# Patient Record
Sex: FEMALE | Race: WHITE | NOT HISPANIC OR LATINO | URBAN - METROPOLITAN AREA
[De-identification: names, ages, dates, MRNs, and addresses within clinical notes are randomized per-mention and may not be internally consistent; named-entity substitution may affect disease eponyms.]

---

## 2018-09-28 LAB
HBV SURFACE AG SERPL QL IA: NEGATIVE
HIV 1+2 AB+HIV1 P24 AG SERPL QL IA: NEGATIVE
RUBELLA ANTIBODY IGG QUANTITATIVE: NORMAL IU/ML

## 2019-04-19 LAB — GROUP B STREP PCR: NEGATIVE

## 2019-05-16 ENCOUNTER — HOSPITAL ENCOUNTER (INPATIENT)
Facility: CLINIC | Age: 34
LOS: 5 days | Discharge: HOME HEALTH | End: 2019-05-21
Attending: OBSTETRICS & GYNECOLOGY | Admitting: OBSTETRICS & GYNECOLOGY
Payer: COMMERCIAL

## 2019-05-16 LAB
GLUCOSE BLDC GLUCOMTR-MCNC: 69 MG/DL (ref 70–99)
HGB BLD-MCNC: 11.1 G/DL (ref 11.7–15.7)

## 2019-05-16 PROCEDURE — 86901 BLOOD TYPING SEROLOGIC RH(D): CPT | Performed by: OBSTETRICS & GYNECOLOGY

## 2019-05-16 PROCEDURE — 85018 HEMOGLOBIN: CPT | Performed by: OBSTETRICS & GYNECOLOGY

## 2019-05-16 PROCEDURE — 86850 RBC ANTIBODY SCREEN: CPT | Performed by: OBSTETRICS & GYNECOLOGY

## 2019-05-16 PROCEDURE — 12000000 ZZH R&B MED SURG/OB

## 2019-05-16 PROCEDURE — 86900 BLOOD TYPING SEROLOGIC ABO: CPT | Performed by: OBSTETRICS & GYNECOLOGY

## 2019-05-16 PROCEDURE — 25000132 ZZH RX MED GY IP 250 OP 250 PS 637: Performed by: OBSTETRICS & GYNECOLOGY

## 2019-05-16 PROCEDURE — 00000146 ZZHCL STATISTIC GLUCOSE BY METER IP

## 2019-05-16 PROCEDURE — 86780 TREPONEMA PALLIDUM: CPT | Performed by: OBSTETRICS & GYNECOLOGY

## 2019-05-16 RX ORDER — ASPIRIN 81 MG/1
81 TABLET ORAL DAILY
Status: ON HOLD | COMMUNITY
End: 2019-05-19

## 2019-05-16 RX ORDER — DEXTROSE MONOHYDRATE 25 G/50ML
25-50 INJECTION, SOLUTION INTRAVENOUS
Status: DISCONTINUED | OUTPATIENT
Start: 2019-05-16 | End: 2019-05-18

## 2019-05-16 RX ORDER — OXYTOCIN 10 [USP'U]/ML
10 INJECTION, SOLUTION INTRAMUSCULAR; INTRAVENOUS
Status: DISCONTINUED | OUTPATIENT
Start: 2019-05-16 | End: 2019-05-18

## 2019-05-16 RX ORDER — OXYCODONE AND ACETAMINOPHEN 5; 325 MG/1; MG/1
1 TABLET ORAL
Status: DISCONTINUED | OUTPATIENT
Start: 2019-05-16 | End: 2019-05-18

## 2019-05-16 RX ORDER — METHYLERGONOVINE MALEATE 0.2 MG/ML
200 INJECTION INTRAVENOUS
Status: DISCONTINUED | OUTPATIENT
Start: 2019-05-16 | End: 2019-05-18

## 2019-05-16 RX ORDER — SODIUM CHLORIDE, SODIUM LACTATE, POTASSIUM CHLORIDE, CALCIUM CHLORIDE 600; 310; 30; 20 MG/100ML; MG/100ML; MG/100ML; MG/100ML
INJECTION, SOLUTION INTRAVENOUS CONTINUOUS
Status: DISCONTINUED | OUTPATIENT
Start: 2019-05-16 | End: 2019-05-18

## 2019-05-16 RX ORDER — ZOLPIDEM TARTRATE 5 MG/1
5 TABLET ORAL
Status: DISCONTINUED | OUTPATIENT
Start: 2019-05-16 | End: 2019-05-18

## 2019-05-16 RX ORDER — ONDANSETRON 2 MG/ML
4 INJECTION INTRAMUSCULAR; INTRAVENOUS EVERY 6 HOURS PRN
Status: DISCONTINUED | OUTPATIENT
Start: 2019-05-16 | End: 2019-05-18

## 2019-05-16 RX ORDER — FENTANYL CITRATE 50 UG/ML
50-100 INJECTION, SOLUTION INTRAMUSCULAR; INTRAVENOUS
Status: DISCONTINUED | OUTPATIENT
Start: 2019-05-16 | End: 2019-05-18

## 2019-05-16 RX ORDER — NICOTINE POLACRILEX 4 MG
15-30 LOZENGE BUCCAL
Status: DISCONTINUED | OUTPATIENT
Start: 2019-05-16 | End: 2019-05-18

## 2019-05-16 RX ORDER — OXYTOCIN/0.9 % SODIUM CHLORIDE 30/500 ML
100-340 PLASTIC BAG, INJECTION (ML) INTRAVENOUS CONTINUOUS PRN
Status: DISCONTINUED | OUTPATIENT
Start: 2019-05-16 | End: 2019-05-18

## 2019-05-16 RX ORDER — CALCIUM CARBONATE 500 MG/1
1000 TABLET, CHEWABLE ORAL EVERY 6 HOURS PRN
Status: DISCONTINUED | OUTPATIENT
Start: 2019-05-16 | End: 2019-05-18

## 2019-05-16 RX ORDER — IBUPROFEN 800 MG/1
800 TABLET, FILM COATED ORAL
Status: DISCONTINUED | OUTPATIENT
Start: 2019-05-16 | End: 2019-05-18

## 2019-05-16 RX ORDER — ACETAMINOPHEN 325 MG/1
650 TABLET ORAL EVERY 4 HOURS PRN
Status: DISCONTINUED | OUTPATIENT
Start: 2019-05-16 | End: 2019-05-18

## 2019-05-16 RX ORDER — DIPHENHYDRAMINE HCL 25 MG
25-50 CAPSULE ORAL EVERY 6 HOURS PRN
Status: DISCONTINUED | OUTPATIENT
Start: 2019-05-16 | End: 2019-05-18

## 2019-05-16 RX ORDER — VITAMIN A ACETATE, .BETA.-CAROTENE, ASCORBIC ACID, CHOLECALCIFEROL, .ALPHA.-TOCOPHEROL ACETATE, DL-, THIAMINE MONONITRATE, RIBOFLAVIN, NIACINAMIDE, PYRIDOXINE HYDROCHLORIDE, FOLIC ACID, CYANOCOBALAMIN, CALCIUM CARBONATE, FERROUS FUMARATE, ZINC OXIDE, AND CUPRIC OXIDE 2000; 2000; 120; 400; 22; 1.84; 3; 20; 10; 1; 12; 200; 27; 25; 2 [IU]/1; [IU]/1; MG/1; [IU]/1; MG/1; MG/1; MG/1; MG/1; MG/1; MG/1; UG/1; MG/1; MG/1; MG/1; MG/1
1 TABLET ORAL DAILY
COMMUNITY

## 2019-05-16 RX ORDER — CARBOPROST TROMETHAMINE 250 UG/ML
250 INJECTION, SOLUTION INTRAMUSCULAR
Status: DISCONTINUED | OUTPATIENT
Start: 2019-05-16 | End: 2019-05-18

## 2019-05-16 RX ORDER — NALOXONE HYDROCHLORIDE 0.4 MG/ML
.1-.4 INJECTION, SOLUTION INTRAMUSCULAR; INTRAVENOUS; SUBCUTANEOUS
Status: DISCONTINUED | OUTPATIENT
Start: 2019-05-16 | End: 2019-05-17

## 2019-05-16 RX ADMIN — DINOPROSTONE 10 MG: 10 INSERT VAGINAL at 22:54

## 2019-05-17 ENCOUNTER — ANESTHESIA (OUTPATIENT)
Dept: OBGYN | Facility: CLINIC | Age: 34
End: 2019-05-17
Payer: COMMERCIAL

## 2019-05-17 ENCOUNTER — ANESTHESIA EVENT (OUTPATIENT)
Dept: OBGYN | Facility: CLINIC | Age: 34
End: 2019-05-17
Payer: COMMERCIAL

## 2019-05-17 LAB
GLUCOSE BLDC GLUCOMTR-MCNC: 106 MG/DL (ref 70–99)
GLUCOSE BLDC GLUCOMTR-MCNC: 118 MG/DL (ref 70–99)
GLUCOSE BLDC GLUCOMTR-MCNC: 129 MG/DL (ref 70–99)
GLUCOSE BLDC GLUCOMTR-MCNC: 172 MG/DL (ref 70–99)
GLUCOSE BLDC GLUCOMTR-MCNC: 54 MG/DL (ref 70–99)
GLUCOSE BLDC GLUCOMTR-MCNC: 76 MG/DL (ref 70–99)
GLUCOSE BLDC GLUCOMTR-MCNC: 83 MG/DL (ref 70–99)
T PALLIDUM AB SER QL: NONREACTIVE

## 2019-05-17 PROCEDURE — 00000146 ZZHCL STATISTIC GLUCOSE BY METER IP

## 2019-05-17 PROCEDURE — 40000671 ZZH STATISTIC ANESTHESIA CASE

## 2019-05-17 PROCEDURE — 25000132 ZZH RX MED GY IP 250 OP 250 PS 637: Performed by: OBSTETRICS & GYNECOLOGY

## 2019-05-17 PROCEDURE — 25800030 ZZH RX IP 258 OP 636: Performed by: OBSTETRICS & GYNECOLOGY

## 2019-05-17 PROCEDURE — 37000011 ZZH ANESTHESIA WARD SERVICE

## 2019-05-17 PROCEDURE — 3E0P7VZ INTRODUCTION OF HORMONE INTO FEMALE REPRODUCTIVE, VIA NATURAL OR ARTIFICIAL OPENING: ICD-10-PCS | Performed by: OBSTETRICS & GYNECOLOGY

## 2019-05-17 PROCEDURE — 12000000 ZZH R&B MED SURG/OB

## 2019-05-17 PROCEDURE — 25000128 H RX IP 250 OP 636: Performed by: OBSTETRICS & GYNECOLOGY

## 2019-05-17 RX ORDER — NALOXONE HYDROCHLORIDE 0.4 MG/ML
.1-.4 INJECTION, SOLUTION INTRAMUSCULAR; INTRAVENOUS; SUBCUTANEOUS
Status: DISCONTINUED | OUTPATIENT
Start: 2019-05-17 | End: 2019-05-17

## 2019-05-17 RX ORDER — NALBUPHINE HYDROCHLORIDE 10 MG/ML
2.5-5 INJECTION, SOLUTION INTRAMUSCULAR; INTRAVENOUS; SUBCUTANEOUS EVERY 6 HOURS PRN
Status: DISCONTINUED | OUTPATIENT
Start: 2019-05-17 | End: 2019-05-18

## 2019-05-17 RX ORDER — TERBUTALINE SULFATE 1 MG/ML
0.25 INJECTION, SOLUTION SUBCUTANEOUS
Status: DISCONTINUED | OUTPATIENT
Start: 2019-05-17 | End: 2019-05-18

## 2019-05-17 RX ORDER — BUPIVACAINE HCL/0.9 % NACL/PF 0.125 %
PLASTIC BAG, INJECTION (ML) EPIDURAL CONTINUOUS
Status: DISCONTINUED | OUTPATIENT
Start: 2019-05-17 | End: 2019-05-18

## 2019-05-17 RX ORDER — NALOXONE HYDROCHLORIDE 0.4 MG/ML
.1-.4 INJECTION, SOLUTION INTRAMUSCULAR; INTRAVENOUS; SUBCUTANEOUS
Status: DISCONTINUED | OUTPATIENT
Start: 2019-05-17 | End: 2019-05-18

## 2019-05-17 RX ORDER — MISOPROSTOL 100 UG/1
25 TABLET ORAL
Status: DISCONTINUED | OUTPATIENT
Start: 2019-05-17 | End: 2019-05-18

## 2019-05-17 RX ORDER — EPHEDRINE SULFATE 50 MG/ML
5 INJECTION, SOLUTION INTRAMUSCULAR; INTRAVENOUS; SUBCUTANEOUS
Status: DISCONTINUED | OUTPATIENT
Start: 2019-05-17 | End: 2019-05-18

## 2019-05-17 RX ORDER — EPHEDRINE SULFATE 50 MG/ML
5 INJECTION, SOLUTION INTRAMUSCULAR; INTRAVENOUS; SUBCUTANEOUS
Status: DISCONTINUED | OUTPATIENT
Start: 2019-05-17 | End: 2019-05-17

## 2019-05-17 RX ORDER — BUPIVACAINE HCL/0.9 % NACL/PF 0.125 %
PLASTIC BAG, INJECTION (ML) EPIDURAL CONTINUOUS
Status: DISCONTINUED | OUTPATIENT
Start: 2019-05-17 | End: 2019-05-17

## 2019-05-17 RX ORDER — OXYTOCIN/0.9 % SODIUM CHLORIDE 30/500 ML
1-24 PLASTIC BAG, INJECTION (ML) INTRAVENOUS CONTINUOUS
Status: DISCONTINUED | OUTPATIENT
Start: 2019-05-17 | End: 2019-05-18

## 2019-05-17 RX ORDER — LIDOCAINE 40 MG/G
CREAM TOPICAL
Status: DISCONTINUED | OUTPATIENT
Start: 2019-05-17 | End: 2019-05-18

## 2019-05-17 RX ADMIN — SODIUM CHLORIDE, POTASSIUM CHLORIDE, SODIUM LACTATE AND CALCIUM CHLORIDE 500 ML: 600; 310; 30; 20 INJECTION, SOLUTION INTRAVENOUS at 14:23

## 2019-05-17 RX ADMIN — Medication 25 MCG: at 14:51

## 2019-05-17 RX ADMIN — Medication 25 MCG: at 12:24

## 2019-05-17 RX ADMIN — FENTANYL CITRATE 50 MCG: 50 INJECTION, SOLUTION INTRAMUSCULAR; INTRAVENOUS at 20:51

## 2019-05-17 RX ADMIN — SODIUM CHLORIDE, POTASSIUM CHLORIDE, SODIUM LACTATE AND CALCIUM CHLORIDE: 600; 310; 30; 20 INJECTION, SOLUTION INTRAVENOUS at 21:01

## 2019-05-17 RX ADMIN — Medication 25 MCG: at 16:56

## 2019-05-17 RX ADMIN — FENTANYL CITRATE 100 MCG: 50 INJECTION, SOLUTION INTRAMUSCULAR; INTRAVENOUS at 22:20

## 2019-05-17 RX ADMIN — FENTANYL CITRATE 50 MCG: 50 INJECTION, SOLUTION INTRAMUSCULAR; INTRAVENOUS at 23:57

## 2019-05-17 NOTE — PROVIDER NOTIFICATION
05/17/19 1230   Provider Notification   Provider Name/Title Dr. Tan   Method of Notification In Department   Request Evaluate - Remote   Notification Reason Status Update   MD updated on BS with patient feeling dizzy previous to this RN entering room, so patient took 4 glucose tablets per patient.  Orders received to have patient eat lunch. Will recheck BS in 1 hour.

## 2019-05-17 NOTE — H&P
[facility] Labor and Delivery History and Physical    Audra Stewart MRN# [id number]   Age: 34 year old YOB: 1985     Date of Admission:  2019    Primary care provider: Jimmy Hui         HPI:   Audra Stewart is a 34 year old  at 39w6d by LMP c/w 6w6d US admitted for IOL secondary to T1DM on insulin pump.    She denies any vaginal bleeding, leakage of fluid or changes in her vaginal discharge.  She denies any regular, painful contractions.  Good fetal movement.     Pregnancy c/b:   - T1DM; A1c last of 6.6% on 19           Pregnancy history:     OBSTETRIC HISTORY:  OB History    Para Term  AB Living   1 0 0 0 0 0   SAB TAB Ectopic Multiple Live Births   0 0 0 0 0      # Outcome Date GA Lbr Alex/2nd Weight Sex Delivery Anes PTL Lv   1 Current                EDC: Estimated Date of Delivery: May 18, 2019    Prenatal Labs:   Lab Results   Component Value Date    ABO O 2019    RH Pos 2019    HGB 11.1 (L) 2019       GBS Status:   GBS negative on     Ultrasounds:  Growth US on 19:   INDICATIONS: Type 1 DM in excellent control.  Probable bicuspid aortic   valve.     --Biometric measures indicate appropriate interval fetal growth.  The EFW   is 2898 gm, which is at the 49th percentile for gestational age.  The AC   is at the 56th percentile for gestational age.   --Amniotic fluid volume is normal, with an KAUSHIK of 13.6 cm.  --No anatomic abnormalities are noted, within the limits of visualization   imposed by gestational age, fetal position, and maternal factors.   --No evidence of placenta previa.  --BPP is reassuring with a score of 8 of 8.   --NST is REACTIVE with baseline FHR of 130 bpm, moderate variability, >2   accelerations of at least 15 bpm lasting at least 15 seconds and no   decelerations. Therefore, total BPP is 10 of 10.   --Cephalic presentation.         Maternal Past Medical History:     Past Medical History:   Diagnosis  Date     Type 1 DM      Past Surgical History:   Procedure Laterality Date     None        Medications Prior to Admission   Medication Sig Dispense Refill Last Dose     aspirin 81 MG EC tablet Take 81 mg by mouth daily   2019 at Unknown time     insulin lispro (HUMALOG) 100 UNIT/ML injection by Device route See Admin Instructions   2019 at Unknown time     Prenatal Vit-Fe Fumarate-FA (PNV PRENATAL PLUS MULTIVITAMIN) 27-1 MG TABS per tablet Take 1 tablet by mouth daily   2019 at Unknown time           Family History:   family history is not on file.          Social History:     Social History     Tobacco Use     Smoking status: Never Smoker     Smokeless tobacco: Never Used   Substance Use Topics     Alcohol use: Not Currently            Review of Systems:   The Review of Systems is negative other than noted in the HPI          Physical Exam:     Patient Vitals for the past 8 hrs:   BP Temp Temp src Resp   19 0930 132/78 98  F (36.7  C) Oral 18   19 0714 120/79 98  F (36.7  C) Oral 18   19 0613 126/80 98.6  F (37  C) Oral 16     Gen: Pleasant, NAD   CV:  Regular rate and rhythm, no murmurs, rubs or gallops appreciated   Resp: Non-labored breathing.  Lungs clear to ausculation bilaterally   Abd: Obese, soft, non-tender and non-distended   Ext: 2+ pedal edema bilaterally     Cervix: 0/30%/-3 on admission, FT/70/-4, posterior and medium   Membranes: Intact  EFW: 7-7.5 lbs.  Presentation:Cephalic    Fetal Heart Rate Tracing:   Baseline 135  Variability: Moderate  Accelerations: Present  Decelerations: None  Interpretation: reactive    Contractions: q 2-6 min per EFM        Assessment:   Audra Stewart is a 34 year old  at 39w6d admitted for IOL secondary to T1DM.        Plan:     Labor:   - Boyer score of 0 on admission, Cervadil placed at 2254 for ripening and just removed.  Patient still with unfavorable SVE.  Discussed recommendations for proceed with PO misoprostol.  She is  amenable to this.   Also discussed possible need/option for cook balloon which she was also amenable to.   Pain: Per patient preference, open to epidural     FWB:   - Continous EFM   - Category I FHT, reactive   - EFW is 2898 gm, which is at the 49th percentile for gestational age. The AC is at the 56th percentile for gestational age.     T1DM:   - Can use personal insulin pump until active labor  - Glucoses per unit protocol  - Insulin gtt in active labor with BG > or = 110   - Internal medicine consult PP per protocol     Other:   - Rh positive, RI, placenta posterior, EFW 7.5#   - GBS negative     Nancy Tan MD   Pager: [phone]   May 17, 2019  10:59 AM

## 2019-05-17 NOTE — PROVIDER NOTIFICATION
05/17/19 0118   Provider Notification   Provider Name/Title Dr. Fajardo   Method of Notification Phone   Request Evaluate - Remote   Notification Reason Status Update;Uterine Activity     Dr. Fajardo updated regarding insulin pump order set. Hospitalist must enter orders and review pump setting and use. MD states that Pt may continue to self-manage pump. Dr. Schuster will reassess Pt use and necessity of pump and orders in AM. May request hospitalist consult at that time if needed.  Pt is nadja occasionally, not feeling contractions. FHR primarily category 1. Late decel x1, moderate variabiliy and accels present.

## 2019-05-17 NOTE — PROVIDER NOTIFICATION
05/17/19 1400   Provider Notification   Provider Name/Title Dr. Tan   Method of Notification In Department   Request Evaluate - Remote   Notification Reason Status Update   MD updated on FHT's with intermittent LD just starting, patient repositioned.  Orders received to continue to monitor and to hold oral Cytotec if LD do not resolve.

## 2019-05-17 NOTE — PLAN OF CARE
Patient blood sugar found to be 172, per MD patient utilizing her insulin pump.  Patient programmed for the pump to give her appropriate insulin for treating the blood sugar. Will plan to reassess patient blood sugar.

## 2019-05-17 NOTE — PROVIDER NOTIFICATION
05/17/19 1757   Provider Notification   Provider Name/Title Dr. Tan   Method of Notification Phone   Request Evaluate - Remote   Notification Reason Status Update   MD updated on patient SROM at 1730 with clear fluid, patient getting more uncomfortable, FHT's with PD and FHT's. WNL after labor interventions of O2, fluid bolus, and position changes. MD also updated on blood pressures. Orders received to continue to monitor.  MD enter orders for pitocin augmentation prn. Dr. Mortensen assuming cares at 1900.

## 2019-05-17 NOTE — PROVIDER NOTIFICATION
05/16/19 2050   Provider Notification   Provider Name/Title Dr. Fajardo   Method of Notification Phone   Request Evaluate - Remote   Notification Reason Patient Arrived;SVE;Uterine Activity     Dr. Fajardo updated on Pt arrival. Pt here for induction due to Type 1 DM. Pt states BG has been very well controlled during pregnancy with her insulin pump. MD states Pt may use insulin pump until in active labor, then nursing is to follow active labor diabetes management order set and discontinue insulin pump. Antepartum diabetes management orders in place. Pt may have snacks overnight and eat a light breakfast in the morning. SVE 0/30/-3. Boyer score of 0. Pt is nadja occasionally, not feeling contractions. FHR category 1. Orders received for induction with Cervidil. Intrapartum orders received. Orders received for Ambien 5mg PO and Benadryl PO 25-50 mg for sleep if requesting. Pt may also have Tums q6 hrs PRN. Will discuss with Pt her current schedule of BG monitoring and target BG. Will call MD to receive further orders for diabetes management.

## 2019-05-17 NOTE — PROVIDER NOTIFICATION
05/17/19 0800   Provider Notification   Provider Name/Title Dr. Tan   Method of Notification Phone   Request Evaluate - Remote   Notification Reason Status Update   MD updated on patient BG this morning, patient utilizing the insulin pump that she uses at home, FHT's, contraction pattern, patient requesting to walk and shower, and patient feeling some cramping.  Orders received to have patient use insulin pump until active labor, then we will plan to switch to our IV insulin protocol, MD will plan to come remove cervidil at 1100 and perform SVE, and then patient may shower and ambulate following removal.

## 2019-05-17 NOTE — PLAN OF CARE
Data: [facility]: 2019  7:38 PM.  Patient admitted for induction for Type 1 DM. Patient is a .  Prenatal record reviewed. Pregnancy  has been complicated by type 1 DM.  Gestational Age 39w5d. VSS. Fetal movement present. Patient denies uterine contractions, leaking of vaginal fluid/rupture of membranes, vaginal bleeding, abdominal pain, pelvic pressure, nausea, vomiting, headache, visual disturbances, epigastric or URQ pain, significant edema. Support person is present.   Action: Verbal consent for EFM.  Admission assessment completed. Bill of rights reviewed.  Response: Patient verbalized agreement with plan. Will contact Dr Beatriz Fajardo with update and further orders.

## 2019-05-17 NOTE — PROVIDER NOTIFICATION
05/16/19 9520   Provider Notification   Provider Name/Title Dr. Fajardo   Method of Notification Phone   Request Evaluate - Remote   Notification Reason Other (Comment)  (Diabetes management orders)     Dr. Fajardo updated. Pt states she checks BG before breakfast, before eating dinner, and at HS. MD states to check BG before all meals and at HS. Pt has followed closely with endocrinology and states she was told her target BG during labor is be  mg/dL. MD states to follow this target. Will follow active labor diabetes management order set and discontinue insulin pump once in active labor.

## 2019-05-17 NOTE — PLAN OF CARE
External monitors removed per MD, patient up to shower and ambulate. Will plan to start oral Cytotec per MD after patient has showered and ambulated.

## 2019-05-17 NOTE — PROVIDER NOTIFICATION
05/17/19 1831   Provider Notification   Provider Name/Title Dr. Mortensen   Method of Notification Phone   Request Evaluate - Remote   Notification Reason Status Update   MD updated on FHT's, contraction pattern, blood sugar, and patient treating blood sugar with insulin pump. Orders received to recheck BS in 1 hours.

## 2019-05-17 NOTE — PROVIDER NOTIFICATION
05/17/19 1431   Provider Notification   Provider Name/Title Dr. Tan   Method of Notification In Department   Request Evaluate - Remote   Notification Reason Status Update   MD updated on patient repositioned again and IV fluid bolus infusing, and oral Cytotec held at this point. MD remaining in department.

## 2019-05-17 NOTE — PROVIDER NOTIFICATION
05/17/19 1040   Provider Notification   Provider Name/Title Dr. Tan   Method of Notification At Bedside   Request Evaluate in Person   Notification Reason Status Update   MD at bedside to remove cervidil, perform SVE, and discuss POC with patient. MD reviewed FHT's and contraction pattern. Orders received to allow patient to shower and ambulate and then start oral Cytotec.

## 2019-05-17 NOTE — PLAN OF CARE
Cervidil inserted at 2254. Pt able to rest well overnight. BG checked at 0351, Pt stated her sensor alarmed due to a low BG. BG 76 mg/dL, Pt asymptomatic. Pt is now feeling cramping but declines pain medications at this time. FHR primarily category 1 overnight.

## 2019-05-18 ENCOUNTER — ANESTHESIA (OUTPATIENT)
Dept: OBGYN | Facility: CLINIC | Age: 34
End: 2019-05-18
Payer: COMMERCIAL

## 2019-05-18 ENCOUNTER — ANESTHESIA EVENT (OUTPATIENT)
Dept: OBGYN | Facility: CLINIC | Age: 34
End: 2019-05-18
Payer: COMMERCIAL

## 2019-05-18 PROBLEM — O24.919 DIABETES MELLITUS AFFECTING PREGNANCY: Status: ACTIVE | Noted: 2019-05-18

## 2019-05-18 PROBLEM — R31.0 GROSS HEMATURIA: Status: ACTIVE | Noted: 2019-05-18

## 2019-05-18 PROBLEM — O61.9 FAILED INDUCTION OF LABOR: Status: ACTIVE | Noted: 2019-05-18

## 2019-05-18 LAB
ABO + RH BLD: NORMAL
BLD GP AB SCN SERPL QL: NORMAL
BLOOD BANK CMNT PATIENT-IMP: NORMAL
GLUCOSE BLDC GLUCOMTR-MCNC: 100 MG/DL (ref 70–99)
GLUCOSE BLDC GLUCOMTR-MCNC: 115 MG/DL (ref 70–99)
GLUCOSE BLDC GLUCOMTR-MCNC: 127 MG/DL (ref 70–99)
GLUCOSE BLDC GLUCOMTR-MCNC: 134 MG/DL (ref 70–99)
GLUCOSE BLDC GLUCOMTR-MCNC: 138 MG/DL (ref 70–99)
GLUCOSE BLDC GLUCOMTR-MCNC: 141 MG/DL (ref 70–99)
GLUCOSE BLDC GLUCOMTR-MCNC: 142 MG/DL (ref 70–99)
GLUCOSE BLDC GLUCOMTR-MCNC: 145 MG/DL (ref 70–99)
GLUCOSE BLDC GLUCOMTR-MCNC: 146 MG/DL (ref 70–99)
GLUCOSE BLDC GLUCOMTR-MCNC: 156 MG/DL (ref 70–99)
GLUCOSE BLDC GLUCOMTR-MCNC: 172 MG/DL (ref 70–99)
GLUCOSE BLDC GLUCOMTR-MCNC: 175 MG/DL (ref 70–99)
GLUCOSE BLDC GLUCOMTR-MCNC: 222 MG/DL (ref 70–99)
GLUCOSE BLDC GLUCOMTR-MCNC: 49 MG/DL (ref 70–99)
GLUCOSE BLDC GLUCOMTR-MCNC: 78 MG/DL (ref 70–99)
GLUCOSE BLDC GLUCOMTR-MCNC: 95 MG/DL (ref 70–99)
HBA1C MFR BLD: 6.3 % (ref 0–5.6)
SPECIMEN EXP DATE BLD: NORMAL
SPECIMEN EXP DATE BLD: NORMAL

## 2019-05-18 PROCEDURE — 25000125 ZZHC RX 250: Performed by: NURSE ANESTHETIST, CERTIFIED REGISTERED

## 2019-05-18 PROCEDURE — 25000128 H RX IP 250 OP 636: Performed by: NURSE ANESTHETIST, CERTIFIED REGISTERED

## 2019-05-18 PROCEDURE — 25800030 ZZH RX IP 258 OP 636: Performed by: OBSTETRICS & GYNECOLOGY

## 2019-05-18 PROCEDURE — 25000128 H RX IP 250 OP 636: Performed by: ANESTHESIOLOGY

## 2019-05-18 PROCEDURE — 27110038 ZZH RX 271: Performed by: ANESTHESIOLOGY

## 2019-05-18 PROCEDURE — 37000009 ZZH ANESTHESIA TECHNICAL FEE, EACH ADDTL 15 MIN: Performed by: OBSTETRICS & GYNECOLOGY

## 2019-05-18 PROCEDURE — 12000000 ZZH R&B MED SURG/OB

## 2019-05-18 PROCEDURE — 99232 SBSQ HOSP IP/OBS MODERATE 35: CPT | Performed by: INTERNAL MEDICINE

## 2019-05-18 PROCEDURE — 36000058 ZZH SURGERY LEVEL 3 EA 15 ADDTL MIN: Performed by: OBSTETRICS & GYNECOLOGY

## 2019-05-18 PROCEDURE — 83036 HEMOGLOBIN GLYCOSYLATED A1C: CPT | Performed by: INTERNAL MEDICINE

## 2019-05-18 PROCEDURE — 25000128 H RX IP 250 OP 636: Performed by: OBSTETRICS & GYNECOLOGY

## 2019-05-18 PROCEDURE — 36000056 ZZH SURGERY LEVEL 3 1ST 30 MIN: Performed by: OBSTETRICS & GYNECOLOGY

## 2019-05-18 PROCEDURE — 99207 ZZC CONSULT E&M CHANGED TO SUBSEQUENT LEVEL: CPT | Performed by: INTERNAL MEDICINE

## 2019-05-18 PROCEDURE — 37000008 ZZH ANESTHESIA TECHNICAL FEE, 1ST 30 MIN: Performed by: OBSTETRICS & GYNECOLOGY

## 2019-05-18 PROCEDURE — 25000125 ZZHC RX 250: Performed by: ANESTHESIOLOGY

## 2019-05-18 PROCEDURE — 3E0R3BZ INTRODUCTION OF ANESTHETIC AGENT INTO SPINAL CANAL, PERCUTANEOUS APPROACH: ICD-10-PCS | Performed by: ANESTHESIOLOGY

## 2019-05-18 PROCEDURE — 36415 COLL VENOUS BLD VENIPUNCTURE: CPT | Performed by: INTERNAL MEDICINE

## 2019-05-18 PROCEDURE — 27210794 ZZH OR GENERAL SUPPLY STERILE: Performed by: OBSTETRICS & GYNECOLOGY

## 2019-05-18 PROCEDURE — 25000125 ZZHC RX 250: Performed by: OBSTETRICS & GYNECOLOGY

## 2019-05-18 PROCEDURE — 00000146 ZZHCL STATISTIC GLUCOSE BY METER IP

## 2019-05-18 PROCEDURE — 00HU33Z INSERTION OF INFUSION DEVICE INTO SPINAL CANAL, PERCUTANEOUS APPROACH: ICD-10-PCS | Performed by: ANESTHESIOLOGY

## 2019-05-18 PROCEDURE — 71000014 ZZH RECOVERY PHASE 1 LEVEL 2 FIRST HR: Performed by: OBSTETRICS & GYNECOLOGY

## 2019-05-18 PROCEDURE — 25800030 ZZH RX IP 258 OP 636: Performed by: ANESTHESIOLOGY

## 2019-05-18 PROCEDURE — 25000132 ZZH RX MED GY IP 250 OP 250 PS 637: Performed by: OBSTETRICS & GYNECOLOGY

## 2019-05-18 RX ORDER — ALBUTEROL SULFATE 0.83 MG/ML
2.5 SOLUTION RESPIRATORY (INHALATION) EVERY 4 HOURS PRN
Status: DISCONTINUED | OUTPATIENT
Start: 2019-05-18 | End: 2019-05-21 | Stop reason: HOSPADM

## 2019-05-18 RX ORDER — SODIUM CHLORIDE 9 MG/ML
INJECTION, SOLUTION INTRAVENOUS CONTINUOUS
Status: DISCONTINUED | OUTPATIENT
Start: 2019-05-18 | End: 2019-05-18

## 2019-05-18 RX ORDER — LIDOCAINE HYDROCHLORIDE 10 MG/ML
INJECTION, SOLUTION EPIDURAL; INFILTRATION; INTRACAUDAL; PERINEURAL PRN
Status: DISCONTINUED | OUTPATIENT
Start: 2019-05-18 | End: 2019-05-18

## 2019-05-18 RX ORDER — NICOTINE POLACRILEX 4 MG
15-30 LOZENGE BUCCAL
Status: DISCONTINUED | OUTPATIENT
Start: 2019-05-18 | End: 2019-05-18

## 2019-05-18 RX ORDER — NALBUPHINE HYDROCHLORIDE 10 MG/ML
2.5-5 INJECTION, SOLUTION INTRAMUSCULAR; INTRAVENOUS; SUBCUTANEOUS EVERY 6 HOURS PRN
Status: DISCONTINUED | OUTPATIENT
Start: 2019-05-18 | End: 2019-05-18

## 2019-05-18 RX ORDER — SODIUM CHLORIDE, SODIUM LACTATE, POTASSIUM CHLORIDE, CALCIUM CHLORIDE 600; 310; 30; 20 MG/100ML; MG/100ML; MG/100ML; MG/100ML
INJECTION, SOLUTION INTRAVENOUS CONTINUOUS
Status: DISCONTINUED | OUTPATIENT
Start: 2019-05-18 | End: 2019-05-18

## 2019-05-18 RX ORDER — AMOXICILLIN 250 MG
1 CAPSULE ORAL 2 TIMES DAILY PRN
Status: DISCONTINUED | OUTPATIENT
Start: 2019-05-18 | End: 2019-05-21 | Stop reason: HOSPADM

## 2019-05-18 RX ORDER — LANOLIN 100 %
OINTMENT (GRAM) TOPICAL
Status: DISCONTINUED | OUTPATIENT
Start: 2019-05-18 | End: 2019-05-21 | Stop reason: HOSPADM

## 2019-05-18 RX ORDER — MEPERIDINE HYDROCHLORIDE 50 MG/ML
12.5 INJECTION INTRAMUSCULAR; INTRAVENOUS; SUBCUTANEOUS
Status: DISCONTINUED | OUTPATIENT
Start: 2019-05-18 | End: 2019-05-18

## 2019-05-18 RX ORDER — HYDROCORTISONE 2.5 %
CREAM (GRAM) TOPICAL 3 TIMES DAILY PRN
Status: DISCONTINUED | OUTPATIENT
Start: 2019-05-18 | End: 2019-05-21 | Stop reason: HOSPADM

## 2019-05-18 RX ORDER — CEFAZOLIN SODIUM 2 G/100ML
2 INJECTION, SOLUTION INTRAVENOUS
Status: DISCONTINUED | OUTPATIENT
Start: 2019-05-18 | End: 2019-05-18

## 2019-05-18 RX ORDER — ACETAMINOPHEN 325 MG/1
975 TABLET ORAL EVERY 8 HOURS
Status: DISCONTINUED | OUTPATIENT
Start: 2019-05-19 | End: 2019-05-18

## 2019-05-18 RX ORDER — KETOROLAC TROMETHAMINE 30 MG/ML
INJECTION, SOLUTION INTRAMUSCULAR; INTRAVENOUS PRN
Status: DISCONTINUED | OUTPATIENT
Start: 2019-05-18 | End: 2019-05-18

## 2019-05-18 RX ORDER — SODIUM CHLORIDE, SODIUM LACTATE, POTASSIUM CHLORIDE, CALCIUM CHLORIDE 600; 310; 30; 20 MG/100ML; MG/100ML; MG/100ML; MG/100ML
INJECTION, SOLUTION INTRAVENOUS CONTINUOUS
Status: DISCONTINUED | OUTPATIENT
Start: 2019-05-18 | End: 2019-05-19

## 2019-05-18 RX ORDER — SCOLOPAMINE TRANSDERMAL SYSTEM 1 MG/1
1 PATCH, EXTENDED RELEASE TRANSDERMAL ONCE
Status: DISCONTINUED | OUTPATIENT
Start: 2019-05-18 | End: 2019-05-18

## 2019-05-18 RX ORDER — KETOROLAC TROMETHAMINE 30 MG/ML
30 INJECTION, SOLUTION INTRAMUSCULAR; INTRAVENOUS EVERY 6 HOURS
Status: DISCONTINUED | OUTPATIENT
Start: 2019-05-19 | End: 2019-05-19 | Stop reason: CLARIF

## 2019-05-18 RX ORDER — NALOXONE HYDROCHLORIDE 0.4 MG/ML
.1-.4 INJECTION, SOLUTION INTRAMUSCULAR; INTRAVENOUS; SUBCUTANEOUS
Status: DISCONTINUED | OUTPATIENT
Start: 2019-05-18 | End: 2019-05-18

## 2019-05-18 RX ORDER — ACETAMINOPHEN 325 MG/1
975 TABLET ORAL ONCE
Status: COMPLETED | OUTPATIENT
Start: 2019-05-18 | End: 2019-05-18

## 2019-05-18 RX ORDER — OXYTOCIN/0.9 % SODIUM CHLORIDE 30/500 ML
340 PLASTIC BAG, INJECTION (ML) INTRAVENOUS CONTINUOUS PRN
Status: DISCONTINUED | OUTPATIENT
Start: 2019-05-18 | End: 2019-05-21 | Stop reason: HOSPADM

## 2019-05-18 RX ORDER — DEXTROSE MONOHYDRATE 25 G/50ML
25-50 INJECTION, SOLUTION INTRAVENOUS
Status: DISCONTINUED | OUTPATIENT
Start: 2019-05-18 | End: 2019-05-21 | Stop reason: HOSPADM

## 2019-05-18 RX ORDER — LIDOCAINE 40 MG/G
CREAM TOPICAL
Status: DISCONTINUED | OUTPATIENT
Start: 2019-05-18 | End: 2019-05-18

## 2019-05-18 RX ORDER — OXYTOCIN/0.9 % SODIUM CHLORIDE 30/500 ML
100 PLASTIC BAG, INJECTION (ML) INTRAVENOUS CONTINUOUS
Status: DISCONTINUED | OUTPATIENT
Start: 2019-05-18 | End: 2019-05-19 | Stop reason: CLARIF

## 2019-05-18 RX ORDER — CEFAZOLIN SODIUM 1 G/3ML
1 INJECTION, POWDER, FOR SOLUTION INTRAMUSCULAR; INTRAVENOUS SEE ADMIN INSTRUCTIONS
Status: DISCONTINUED | OUTPATIENT
Start: 2019-05-18 | End: 2019-05-18

## 2019-05-18 RX ORDER — NICOTINE POLACRILEX 4 MG
15-30 LOZENGE BUCCAL
Status: DISCONTINUED | OUTPATIENT
Start: 2019-05-18 | End: 2019-05-21 | Stop reason: HOSPADM

## 2019-05-18 RX ORDER — OXYTOCIN 10 [USP'U]/ML
10 INJECTION, SOLUTION INTRAMUSCULAR; INTRAVENOUS
Status: DISCONTINUED | OUTPATIENT
Start: 2019-05-18 | End: 2019-05-21 | Stop reason: HOSPADM

## 2019-05-18 RX ORDER — EPHEDRINE SULFATE 50 MG/ML
5 INJECTION, SOLUTION INTRAMUSCULAR; INTRAVENOUS; SUBCUTANEOUS
Status: DISCONTINUED | OUTPATIENT
Start: 2019-05-18 | End: 2019-05-18

## 2019-05-18 RX ORDER — DEXTROSE, SODIUM CHLORIDE, SODIUM LACTATE, POTASSIUM CHLORIDE, AND CALCIUM CHLORIDE 5; .6; .31; .03; .02 G/100ML; G/100ML; G/100ML; G/100ML; G/100ML
INJECTION, SOLUTION INTRAVENOUS CONTINUOUS
Status: DISCONTINUED | OUTPATIENT
Start: 2019-05-18 | End: 2019-05-18

## 2019-05-18 RX ORDER — DEXTROSE MONOHYDRATE 25 G/50ML
25-50 INJECTION, SOLUTION INTRAVENOUS
Status: DISCONTINUED | OUTPATIENT
Start: 2019-05-18 | End: 2019-05-18

## 2019-05-18 RX ORDER — ONDANSETRON 2 MG/ML
4 INJECTION INTRAMUSCULAR; INTRAVENOUS EVERY 30 MIN PRN
Status: DISCONTINUED | OUTPATIENT
Start: 2019-05-18 | End: 2019-05-18

## 2019-05-18 RX ORDER — MORPHINE SULFATE 1 MG/ML
INJECTION, SOLUTION EPIDURAL; INTRATHECAL; INTRAVENOUS PRN
Status: DISCONTINUED | OUTPATIENT
Start: 2019-05-18 | End: 2019-05-18

## 2019-05-18 RX ORDER — AMOXICILLIN 250 MG
2 CAPSULE ORAL 2 TIMES DAILY PRN
Status: DISCONTINUED | OUTPATIENT
Start: 2019-05-18 | End: 2019-05-21 | Stop reason: HOSPADM

## 2019-05-18 RX ORDER — OXYCODONE HYDROCHLORIDE 5 MG/1
5-10 TABLET ORAL
Status: DISCONTINUED | OUTPATIENT
Start: 2019-05-18 | End: 2019-05-21 | Stop reason: HOSPADM

## 2019-05-18 RX ORDER — OXYTOCIN/0.9 % SODIUM CHLORIDE 30/500 ML
PLASTIC BAG, INJECTION (ML) INTRAVENOUS PRN
Status: DISCONTINUED | OUTPATIENT
Start: 2019-05-18 | End: 2019-05-18

## 2019-05-18 RX ORDER — DEXTROSE MONOHYDRATE 25 G/50ML
25-50 INJECTION, SOLUTION INTRAVENOUS
Status: DISCONTINUED | OUTPATIENT
Start: 2019-05-18 | End: 2019-05-19 | Stop reason: DRUGHIGH

## 2019-05-18 RX ORDER — NICOTINE POLACRILEX 4 MG
15-30 LOZENGE BUCCAL
Status: DISCONTINUED | OUTPATIENT
Start: 2019-05-18 | End: 2019-05-19 | Stop reason: DRUGHIGH

## 2019-05-18 RX ORDER — HYDROMORPHONE HYDROCHLORIDE 1 MG/ML
.3-.5 INJECTION, SOLUTION INTRAMUSCULAR; INTRAVENOUS; SUBCUTANEOUS EVERY 10 MIN PRN
Status: DISCONTINUED | OUTPATIENT
Start: 2019-05-18 | End: 2019-05-18

## 2019-05-18 RX ORDER — NALOXONE HYDROCHLORIDE 0.4 MG/ML
.1-.4 INJECTION, SOLUTION INTRAMUSCULAR; INTRAVENOUS; SUBCUTANEOUS
Status: DISCONTINUED | OUTPATIENT
Start: 2019-05-18 | End: 2019-05-21 | Stop reason: HOSPADM

## 2019-05-18 RX ORDER — ACETAMINOPHEN 325 MG/1
650 TABLET ORAL EVERY 4 HOURS PRN
Status: DISCONTINUED | OUTPATIENT
Start: 2019-05-21 | End: 2019-05-18

## 2019-05-18 RX ORDER — LIDOCAINE HCL/EPINEPHRINE/PF 2%-1:200K
VIAL (ML) INJECTION PRN
Status: DISCONTINUED | OUTPATIENT
Start: 2019-05-18 | End: 2019-05-18

## 2019-05-18 RX ORDER — LIDOCAINE HYDROCHLORIDE 20 MG/ML
INJECTION, SOLUTION INFILTRATION; PERINEURAL PRN
Status: DISCONTINUED | OUTPATIENT
Start: 2019-05-18 | End: 2019-05-18

## 2019-05-18 RX ORDER — FENTANYL CITRATE 50 UG/ML
25-50 INJECTION, SOLUTION INTRAMUSCULAR; INTRAVENOUS
Status: DISCONTINUED | OUTPATIENT
Start: 2019-05-18 | End: 2019-05-18

## 2019-05-18 RX ORDER — SIMETHICONE 80 MG
80 TABLET,CHEWABLE ORAL 4 TIMES DAILY PRN
Status: DISCONTINUED | OUTPATIENT
Start: 2019-05-18 | End: 2019-05-21 | Stop reason: HOSPADM

## 2019-05-18 RX ORDER — CITRIC ACID/SODIUM CITRATE 334-500MG
30 SOLUTION, ORAL ORAL
Status: COMPLETED | OUTPATIENT
Start: 2019-05-18 | End: 2019-05-18

## 2019-05-18 RX ORDER — DIMENHYDRINATE 50 MG/ML
25 INJECTION, SOLUTION INTRAMUSCULAR; INTRAVENOUS
Status: DISCONTINUED | OUTPATIENT
Start: 2019-05-18 | End: 2019-05-18

## 2019-05-18 RX ORDER — IBUPROFEN 600 MG/1
600 TABLET, FILM COATED ORAL EVERY 6 HOURS PRN
Status: DISCONTINUED | OUTPATIENT
Start: 2019-05-20 | End: 2019-05-19

## 2019-05-18 RX ORDER — ONDANSETRON 2 MG/ML
4 INJECTION INTRAMUSCULAR; INTRAVENOUS EVERY 6 HOURS PRN
Status: DISCONTINUED | OUTPATIENT
Start: 2019-05-18 | End: 2019-05-21 | Stop reason: HOSPADM

## 2019-05-18 RX ORDER — BUPIVACAINE HCL/0.9 % NACL/PF 0.125 %
PLASTIC BAG, INJECTION (ML) EPIDURAL CONTINUOUS
Status: DISCONTINUED | OUTPATIENT
Start: 2019-05-18 | End: 2019-05-18

## 2019-05-18 RX ORDER — BISACODYL 10 MG
10 SUPPOSITORY, RECTAL RECTAL DAILY PRN
Status: DISCONTINUED | OUTPATIENT
Start: 2019-05-20 | End: 2019-05-21 | Stop reason: HOSPADM

## 2019-05-18 RX ORDER — ONDANSETRON 2 MG/ML
INJECTION INTRAMUSCULAR; INTRAVENOUS PRN
Status: DISCONTINUED | OUTPATIENT
Start: 2019-05-18 | End: 2019-05-18

## 2019-05-18 RX ORDER — ONDANSETRON 4 MG/1
4 TABLET, ORALLY DISINTEGRATING ORAL EVERY 30 MIN PRN
Status: DISCONTINUED | OUTPATIENT
Start: 2019-05-18 | End: 2019-05-18

## 2019-05-18 RX ADMIN — OXYTOCIN-SODIUM CHLORIDE 0.9% IV SOLN 30 UNIT/500ML 1 ML: 30-0.9/5 SOLUTION at 17:45

## 2019-05-18 RX ADMIN — AZITHROMYCIN MONOHYDRATE 500 MG: 500 INJECTION, POWDER, LYOPHILIZED, FOR SOLUTION INTRAVENOUS at 17:13

## 2019-05-18 RX ADMIN — LIDOCAINE HYDROCHLORIDE 5 ML: 20 INJECTION, SOLUTION INFILTRATION; PERINEURAL at 17:19

## 2019-05-18 RX ADMIN — SODIUM CHLORIDE, POTASSIUM CHLORIDE, SODIUM LACTATE AND CALCIUM CHLORIDE: 600; 310; 30; 20 INJECTION, SOLUTION INTRAVENOUS at 17:20

## 2019-05-18 RX ADMIN — FENTANYL CITRATE 100 MCG: 50 INJECTION, SOLUTION INTRAMUSCULAR; INTRAVENOUS at 04:46

## 2019-05-18 RX ADMIN — SODIUM CITRATE AND CITRIC ACID MONOHYDRATE 30 ML: 500; 334 SOLUTION ORAL at 17:12

## 2019-05-18 RX ADMIN — Medication 2 UNITS/HR: at 09:36

## 2019-05-18 RX ADMIN — LIDOCAINE HYDROCHLORIDE 5 ML: 20 INJECTION, SOLUTION INFILTRATION; PERINEURAL at 17:10

## 2019-05-18 RX ADMIN — OXYTOCIN-SODIUM CHLORIDE 0.9% IV SOLN 30 UNIT/500ML 100 ML/HR: 30-0.9/5 SOLUTION at 20:00

## 2019-05-18 RX ADMIN — FENTANYL CITRATE 100 MCG: 50 INJECTION, SOLUTION INTRAMUSCULAR; INTRAVENOUS at 05:56

## 2019-05-18 RX ADMIN — FENTANYL CITRATE 100 MCG: 50 INJECTION, SOLUTION INTRAMUSCULAR; INTRAVENOUS at 03:40

## 2019-05-18 RX ADMIN — OXYTOCIN-SODIUM CHLORIDE 0.9% IV SOLN 30 UNIT/500ML 1 MILLI-UNITS/MIN: 30-0.9/5 SOLUTION at 06:39

## 2019-05-18 RX ADMIN — FENTANYL CITRATE 100 MCG: 50 INJECTION, SOLUTION INTRAMUSCULAR; INTRAVENOUS at 02:27

## 2019-05-18 RX ADMIN — LIDOCAINE HYDROCHLORIDE,EPINEPHRINE BITARTRATE 5 ML: 20; .005 INJECTION, SOLUTION EPIDURAL; INFILTRATION; INTRACAUDAL; PERINEURAL at 17:19

## 2019-05-18 RX ADMIN — ONDANSETRON 4 MG: 2 INJECTION INTRAMUSCULAR; INTRAVENOUS at 17:34

## 2019-05-18 RX ADMIN — ONDANSETRON 4 MG: 2 INJECTION INTRAMUSCULAR; INTRAVENOUS at 00:05

## 2019-05-18 RX ADMIN — LIDOCAINE HYDROCHLORIDE,EPINEPHRINE BITARTRATE 5 ML: 20; .005 INJECTION, SOLUTION EPIDURAL; INFILTRATION; INTRACAUDAL; PERINEURAL at 17:10

## 2019-05-18 RX ADMIN — ACETAMINOPHEN 975 MG: 325 TABLET, FILM COATED ORAL at 17:10

## 2019-05-18 RX ADMIN — SODIUM CHLORIDE, POTASSIUM CHLORIDE, SODIUM LACTATE AND CALCIUM CHLORIDE: 600; 310; 30; 20 INJECTION, SOLUTION INTRAVENOUS at 01:06

## 2019-05-18 RX ADMIN — Medication: at 08:48

## 2019-05-18 RX ADMIN — SODIUM CHLORIDE, SODIUM LACTATE, POTASSIUM CHLORIDE, CALCIUM CHLORIDE AND DEXTROSE MONOHYDRATE: 5; 600; 310; 30; 20 INJECTION, SOLUTION INTRAVENOUS at 09:32

## 2019-05-18 RX ADMIN — MORPHINE SULFATE 3 MG: 1 INJECTION EPIDURAL; INTRATHECAL; INTRAVENOUS at 18:00

## 2019-05-18 RX ADMIN — OXYTOCIN-SODIUM CHLORIDE 0.9% IV SOLN 30 UNIT/500ML 350 ML: 30-0.9/5 SOLUTION at 18:28

## 2019-05-18 RX ADMIN — FENTANYL CITRATE 100 MCG: 50 INJECTION, SOLUTION INTRAMUSCULAR; INTRAVENOUS at 00:57

## 2019-05-18 RX ADMIN — KETOROLAC TROMETHAMINE 30 MG: 30 INJECTION, SOLUTION INTRAMUSCULAR at 18:24

## 2019-05-18 RX ADMIN — SODIUM CHLORIDE: 9 INJECTION, SOLUTION INTRAVENOUS at 09:38

## 2019-05-18 ASSESSMENT — MIFFLIN-ST. JEOR: SCORE: 1695.18

## 2019-05-18 NOTE — ANESTHESIA PROCEDURE NOTES
Peripheral nerve/Neuraxial procedure note : epidural catheter  Pre-Procedure  Performed by Edwin Jaime DO  Referred by XENA ANGLIN MD  Location: OB      Pre-Anesthestic Checklist: patient identified, IV checked, risks and benefits discussed, informed consent, monitors and equipment checked, pre-op evaluation and at physician/surgeon's request    Timeout  Correct Patient: Yes   Correct Procedure: Yes   Correct Site: Yes   Correct Laterality: N/A   Correct Position: Yes   Site Marked: N/A   .   Procedure Documentation    .    Procedure:    Epidural catheter.     .  .       Assessment/Narrative  .  .  . Comments:  .Diagnosis- Anticipated vaginal delivery    Continuous Labor Epidural, indication is for labor pain. Following an discussion of the expectations, benefits and risk (including but not limited to nerve damage, infection, bleeding, spinal headache, partial or failed block)--questions were encouraged and answered. The patient appears to understand, and consents to proceed.     The patient received a fluid bolus per orders    Time-out performed.     The patient was in the sitting position, a PVP prep times three was done in the lumbar area followed by placement of a sterile drape. The skin was then infiltrated with lidocaine. A 17ga Touhy needle was then advanced under a loss of resistance technique until the epidural space was identified. The epidural catheter was then advanced and secured. The catheter was then bolused in divided doses with Bupivicaine 0.25% 12 cc, while the patient/fetus was monitored. Infusion orders written and infusion of 0.125% bupivicaine 15cc per hour started.    I or my partner, was immediately available and frequently monitored at necessary intervals.      ARELY Jaime

## 2019-05-18 NOTE — PROVIDER NOTIFICATION
05/18/19 0740   Provider Notification   Provider Name/Title Dr. Linares   Method of Notification In Department;At Bedside   Request Evaluate in Person   Dr. Linares at bedside for SVE 3/90/-1. Also discussed plan for the day. Patient requested for epidural at this time. Insulin pump delivering 2 units per hour basal rate. Verbal order received to discontinue patient's insulin pump post epidural placement and begin active labor management protocol. Verbal order to start insulin at 2 units per hour on discontinuation of patients insulin pump.

## 2019-05-18 NOTE — PROVIDER NOTIFICATION
05/18/19 1130   Provider Notification   Provider Name/Title Dr. Miller   Method of Notification At Bedside   Request Evaluate in Person   Notification Reason Decels;Variability Change;Labor Status;Status Update   MD paged at 1115 for decels, minimal variability and interventions(Bolus, oxygen applied, repositioned).  at bedside to evaluate FHT and assess patient. SVE 3/90/0. Scalp stimulation done by MD- fetal HR response. IUPC placed by MD.

## 2019-05-18 NOTE — PROGRESS NOTES
Chart reviewed, met couple. S/p cervidil, then 3 doses cytotec, now on pitocin.  Audra is uncomfortable, breathing through contractions.  Pitocin at 2 mu.min.  Ctx still spaced.  Requesting epidural.  Has been using own insulin pump, at 2 unit(s)/hour.  Will start insulin drip at 2 unit(s)/hour after epidural in and monitor per protocol.  EFW of 7-7.5# noted on initial H&PP.  FHT category 1.    SVE 3/70/-1.  SROM yesterday at 1730.  GBS negative.     Anticipate .  Will titrate pitocin to regular contractions and adequate labor.    Nurys Linares MD on 2019 at 7:50 AM

## 2019-05-18 NOTE — PROVIDER NOTIFICATION
05/17/19 2027   Provider Notification   Provider Name/Title Dr. Mortensen   Method of Notification Phone   Request Evaluate - Remote   Notification Reason Uterine Activity;Pain;Status Update   Discussed with MD pt had sudden increase in pain to 8/10 bilat lower abd. Also has new location of pain now sharp pain associated with contractions in right upper quadrant. Denies SOB. /76. Denies s/sx of pre-eclampsia. Contractions every 1.5-5.5 minutes with episode of contractions 5 in a row with minimal rest period. FHT Category I.     MD requests SVE and update on phone shortly.

## 2019-05-18 NOTE — PROVIDER NOTIFICATION
19 1650   Provider Notification   Provider Name/Title Dr. Linares   Method of Notification In Department   Request Evaluate in Person   Notification Reason Decels   Provider notified of late decelerations- pitocin shut off- bolus started.  decided.

## 2019-05-18 NOTE — CONSULTS
Hospitalist Consultation      Audra Stewart MRN# [id number]   YOB: 1985 Age: 34 year old   Date of Admission: 2019     Requesting Physician:  Dr. Linares  Reason for consult: Medical Management           Assessment and Plan:   Ms. Stewart is a 34-year-old female  at 39-w+6 d pregnant admitted for induction of labor secondary to type 1 diabetes mellitus medicine team consulted to manage diabetes.  Patient in active labor.  Started on IV insulin infusion.  Prior to induction of labor she was using insulin pump.    Induction of labor  --Plan per OB/GYN  --Patient received epidural      Type 1 diabetes mellitus  --Prior to induction of labor he was on insulin pump.  She did have frequent hypoglycemia with blood glucose down to 50s.  --Now on IV infusion during active labor.  Postpartum will consider restarting insulin pump  --We will need to closely monitor blood glucose  --Check for hypoglycemia  --Protocol ordered for treatment of hypoglycemia      Thank you for the consult will continue to follow along               History of Present Illness:   This patient is a 34 year old female with past medical history significant for type 1 diabetes mellitus on insulin pump is 39+ weeks pregnant admitted for induction of labor.  In the recent days patient's blood glucose has been down to 50s.  She is now actively in labor.  Insulin was switched to IV infusion.     Pain is well controlled now with epidural.  Patient is actively in labor.  Denies any nausea vomiting.  Not eating much.  Continues monitoring of glucose using her [employer] meter.  Receiving 1 unit/h insulin  Infusion.  Review of all the other symptoms are negative.                  Past Medical History:     Past Medical History:   Diagnosis Date     Type 1 DM              Past Surgical History:     Past Surgical History:   Procedure Laterality Date     None                 Social History:     Social History     Tobacco Use     Smoking  status: Never Smoker     Smokeless tobacco: Never Used   Substance Use Topics     Alcohol use: Not Currently             Family History:     Family History   Problem Relation Age of Onset     Anesthesia Reaction No family hx of      Clotting Disorder No family hx of              Allergies:     Allergies   Allergen Reactions     Gluten Meal      Unstable glucoses with gluten             Medications:     Medications Prior to Admission   Medication Sig Dispense Refill Last Dose     aspirin 81 MG EC tablet Take 81 mg by mouth daily   5/16/2019 at Unknown time     insulin lispro (HUMALOG) 100 UNIT/ML injection by Device route See Admin Instructions   5/16/2019 at Unknown time     Prenatal Vit-Fe Fumarate-FA (PNV PRENATAL PLUS MULTIVITAMIN) 27-1 MG TABS per tablet Take 1 tablet by mouth daily   5/16/2019 at Unknown time               Review of Systems:   A comprehensive greater than 10 system review of systems was carried out.  Pertinent positives and negatives are noted above.  Otherwise negative for contributory info.            Physical Exam:   Vitals were reviewed  Blood pressure 126/58, pulse 76, temperature 98.6  F (37  C), temperature source Oral, resp. rate 16.  Exam:   GENERAL: In active labor   pSYCH: pleasant, oriented, No acute distress.  EYES: PERRLA, Normal conjunctiva.  HEART:  Normal S1, S2 with no edema.  LUNGS:  Clear to auscultation, normal Respiratory effort.  ABDOMEN:  Soft, distended with fetal monitor, normal bowel sounds.  SKIN:  Dry to touch, No rash.  Neuro: Non focal with normal motor power, sensation, CN's and Reflexes.           Data:   Past 24 hours labs, studies, and imaging were reviewed.  All laboratory data reviewed  All laboratory and imaging data in the past 24 hours reviewed

## 2019-05-18 NOTE — PLAN OF CARE
Dr Linares updated of patient blood sugar, desire to have epdiural. Diabetic orders for labor placed.

## 2019-05-18 NOTE — PROGRESS NOTES
Comfortable with epidural. Fetus shows minimal variability with early decelerations and few late decelerations.  O2 on, fluid bolus going.  SVE 3-4/90/0.  IUPC placed.  + scalp stim response.  Now appears to have early decelerations.  + accelerations, good variability.  Ctx q 5-6 minutes.  Titrate pitocin to adequate labor as baby tolerates.  Continue close monitoring.    Nurys Linares MD on 5/18/2019 at 12:03 PM

## 2019-05-18 NOTE — PLAN OF CARE
See 05/18 0610 note regarding 0600 BS.     Pt self-administered Humalog 3 units per insulin pump at 0615 for continued high BS. At 0636 BS per glucometer is 222, self-administered Humalog 4 units per insulin pump.    Will continue to monitor BS and have pt treat appropriately. The plan continues to be for pt to self-administer via insulin pump per her insulin plan made with Endocrinology until she is not able to concentrate on changes d/t labor pain.

## 2019-05-18 NOTE — OP NOTE
OPERATIVE REPORT    Indication:    Audra Stewart is a 34 year old  at 39 5/7 weeks who was admitted for induction of labor on 19 due IDDM.  She had cervidil, 3 doses of cytotec, then pitocin.  She had SROM, and reached 3-4 cm dilation where she remained.  The cervix became edematous.  Labor was adequate, then fetal decelerations caused pitocin to be discontinued and restarted.   Ultimately she had failure to progress at 3-4 cm.  section was recommended.  Benefits and risks were explained, and consent obtained.    Pre Operative Diagnosis: IUP 40 weeks.  IDDM.  Failure to progress at 3-4 cm.    Post Operative Diagnosis: same.    Procedure:  Primary low transverse  section, 2 layer closure    Surgeon:  Nurys Linares MD    Anesthesia: epidural    Findings:  Viable male fetus weighing 8# with apgars 8/9, from BARBARA position, slightly asynclitic.  Normal uterus, normal adnexa.    EBL: 832g QBL    Complications:  None    Narrative:    Audra Stewart was taken to the operating room where proper identification and procedure were confirmed.  After increased epidural anesthesia, she was  positioned in the left lateral tilt and prepped and draped in the usual sterile fashion.  A taylor and SCDs were in place.    The abdomen was entered through a pfannensteil incision in the usual fashion.  The bladder flap was created and safely retracted.  The uterus was entered through a transverse incision in the lower uterine segment, extended with digital direction.    The vertex was elevated, and delivered with fundal pressure.  The body delivered easily. The baby was bulb-suctioned and the cord doubly clamped and cut, and the baby handed off to the nursery team.    The placenta delivered with cord traction and uterine massage.  The uterine cavity was wiped clean. The hysterotomy extended to the right.  The uterus was exteriorized for better visualization.  The uterine incision was closed with running, locking  0-vicryl and imbricated with 0-PDS.  Hemostasis was assured.  The pelvis was irrigated.    The peritoneum was closed with running 3-0 vicryl. The rectus and fascial layer was confirmed hemostatic.  The fascia was closed with running 0-vicryl.  The wound was copiously irrigated and hemostasis assured.  SubQ fascial was approximated with interrupted 3-0 vicryl.  The skin was closed with running 4-0 vicryl.  Steri strips and a sterile dressing were applied.    The patient was returned to the [facility] in good condition.  Sponge and needle counts are correct.    Nurys Linares MD  May 18, 2019

## 2019-05-18 NOTE — PLAN OF CARE
Pt reports her continuous glucose monitor indicating blood sugar trending downward around 2300 with feeling tired from low blood sugar. She self administered 4 tablets of glucose tablets around 2300. 30 minutes later blood sugar 50. Pt again self administered 4 tablets of glucose tablets and decreased basal rate to 1.0 units/hr from 1.3 units/hr per Endocrinology instructions to pt. Pt eating crackers and drinking apple juice at 0015.    Dr. Mortensen aware of blood sugars and of pt using glucose tablets instead of glucose gel. See 05/17 9145 note and MD note.    Update: BS 95 with pt reporting feeling better. Pt states continuous blood sugar monitor will alarm if it drops again while she is sleeping.

## 2019-05-18 NOTE — PLAN OF CARE
Blood sugar obtained via hospital glucometer 175. Pts monitor stating 159 delivering 2/units per hour maintenance. Manual insulin pump calling for 2.5 units delivered now for correction of 159 reading. Patient delivered this via personal insulin pump.

## 2019-05-18 NOTE — PROGRESS NOTES
Fetus showed bradycardia, responded to position change, O2, fluid, pitocin off, FSE. Returned to baseline.  Early decelerations.  SVE 90/0, no significant change from my last exam.  Adequate labor by MVU since about 1240.  If no progress by 1630 or so, would recommend  delivery.  Sooner of course if nonreassuring fetal tracing.  Discussed c/s, risks, and benefits.  Pt and  have no questions.    Nurys Linares MD on 2019 at 2:42 PM

## 2019-05-18 NOTE — PROVIDER NOTIFICATION
05/17/19 0138   Provider Notification   Provider Name/Title Dr. Mortensen   Method of Notification At Bedside   Request Evaluate in Person   Notification Reason SVE;Status Update   MD at bedside. Discuss contractions every 2-7 minutes. FHT Category II with moderate variability and accelerations, intermittent variable decels. Pt breathing through contractions and prn fentanyl used for pain relief. MD completed SVE 2/80/-3.    Plan per MD is that if contractions <4 per 10 minutes consistently okay to given po cytotec dose.     MD aware of low BS of 50 after 8 tablets of glucose tablets. Plan is if blood sugar doesn't stabilize after food will change pt over to insulin gtt per protocol.

## 2019-05-18 NOTE — PROVIDER NOTIFICATION
05/18/19 1426   Provider Notification   Provider Name/Title Dr. Linares   Method of Notification At Bedside   Request Evaluate in Person   Provider at bedside- updated on pitocin upped to 8- shortly after prolonged decel audible for 7 mins. Patient repositioned, flushed, pitocin off, O2 was still on, FSE applied at 1429 at which FHT returned to baseline.

## 2019-05-18 NOTE — PROGRESS NOTES
We have tried to resume pitocin to adequate labor, baby has had late decelerations and we are not yet adequate.  SVE edematous 3-4 cm, 0 station.  Has made no progress, but cervix now edematous and hematuria noted.  Very unlikely to achieve vaginal delivery due to failure to progress, probable CPD.  Pt agrees,  delivery recommended.  Benefits and risks reviewed.  Consent signed.  Ancef and zithromax for preop abx prophylaxis.      Nurys Linares MD on 2019 at 5:08 PM

## 2019-05-18 NOTE — ANESTHESIA CARE TRANSFER NOTE
Patient: Audra Stewart    Procedure(s):  PLACE ON STANDBY, SURGERY TEAM, FOR  SECTION    Diagnosis: pregnancy  Diagnosis Additional Information: No value filed.    Anesthesia Type:   Epidural     Note:  Airway :Room Air  Patient transferred to:Labor and Delivery  Handoff Report: Identifed the Patient, Identified the Reponsible Provider, Reviewed the pertinent medical history, Discussed the surgical course, Reviewed Intra-OP anesthesia mangement and issues during anesthesia, Set expectations for post-procedure period and Allowed opportunity for questions and acknowledgement of understanding      Vitals: (Last set prior to Anesthesia Care Transfer)    CRNA VITALS  2019 1807 - 2019 1841      2019             EKG:  Sinus rhythm                Electronically Signed By: JOHAN Kaur CRNA  May 18, 2019  6:41 PM

## 2019-05-18 NOTE — PROGRESS NOTES
[facility]   PROGRESS NOTE     S. Pt states she is doing well overall. She has been feeling slightly nauseous just recently and thinks its related with hypoglycemia . +FM    /76   Pulse 76   Temp 98.6  F (37  C) (Oral)   Resp 18     [place] ctxs q 4-5 min   bpm, mod, a +, d -  SVE tight 2 cm/ 80 / -3, moderate    A/P Audra Stewart is a 34 year old  at 39w6d here with IOL secondary to T1DM on insulin     - continue prolonged monitoring  - s/p 3 doses of Cytotec   - pt was nadja too frequently   - likely resume cytotec once contractions are equal or less than 4 cts in 10 min  - continue self insulin pump BS control    - pt encouraged to have snacks since she has not had any solid foods since this morning and she has been correcting her hypoglycemic episodes only with glucose tabs   - if pt continues to be 50 or less despite glucose tabs and solid food then will proceed with correction per protocol   - will start insulin IV sliding scale per protocol once in active labor  - T Cat I  - continue monitoring    Dr. Deepak Mortensen  [phone]

## 2019-05-18 NOTE — ANESTHESIA PREPROCEDURE EVALUATION
Anesthesia Pre-Procedure Evaluation    Patient: Audra Stewart   MRN: [id number] : 1985          Preoperative Diagnosis: * No pre-op diagnosis entered *    * No procedures listed *    Past Medical History:   Diagnosis Date     Type 1 DM      Past Surgical History:   Procedure Laterality Date     None       Anesthesia Evaluation       history and physical reviewed .             ROS/MED HX    ENT/Pulmonary:  - neg pulmonary ROS     Neurologic:  - neg neurologic ROS     Cardiovascular:  - neg cardiovascular ROS       METS/Exercise Tolerance:     Hematologic:         Musculoskeletal:         GI/Hepatic:  - neg GI/hepatic ROS       Renal/Genitourinary:         Endo:     (+) type I DM, .      Psychiatric:         Infectious Disease:         Malignancy:         Other:                     neg OB ROS            Physical Exam  Normal systems: cardiovascular, pulmonary and dental    Airway   Mallampati: II    Dental     Cardiovascular       Pulmonary     Other findings: .Lab Test        19                       2247          HGB          11.1*          No lab results found.        Lab Results   Component Value Date    HGB 11.1 (L) 2019       Preop Vitals  BP Readings from Last 3 Encounters:   19 126/58    Pulse Readings from Last 3 Encounters:   19 76      Resp Readings from Last 3 Encounters:   19 16    SpO2 Readings from Last 3 Encounters:   No data found for SpO2      Temp Readings from Last 1 Encounters:   19 98.6  F (37  C) (Oral)    Ht Readings from Last 1 Encounters:   No data found for Ht      Wt Readings from Last 1 Encounters:   No data found for Wt    There is no height or weight on file to calculate BMI.       Anesthesia Plan  Procedure only, no anesthetic delivered    History & Physical Review      ASA Status:  2 .  OB Epidural Asa: 2       Plan for Epidural          Postoperative Care      Consents  Anesthetic plan, risks, benefits and alternatives discussed with:   Patient..                 Edwin Jaime DO                    .

## 2019-05-18 NOTE — PROVIDER NOTIFICATION
05/18/19 0610   Provider Notification   Provider Name/Title Dr. Mortensen   Method of Notification Phone   Request Evaluate - Remote   Notification Reason SVE;Status Update;Uterine Activity;Pain   Discussed with MD contractions were every 1.5-5.5 minutes until recently. Now spacing more and every 1.5-7 minutes, lasting  seconds. Pt continues to receive prn fentanyl for labor pain and breathing through contractions. -130s with moderate variability, early decels, and intermittent variable decels. SVE 3.5/90/-3, Boy[place] 7.     BS at 0600 196 per pt's continuous glucometer. She dosed herself with Humalog 4.2 units per insulin pump. Plan will be to test with glucometer at 0630.     MD orders Pitocin gtt.

## 2019-05-18 NOTE — PROVIDER NOTIFICATION
19 1653   Provider Notification   Provider Name/Title Dr. Linares   Method of Notification At Bedside   SVE showed no change. Decels present at 6 of pitocin. Decision for  now.

## 2019-05-18 NOTE — PROVIDER NOTIFICATION
05/17/19 2042   Provider Notification   Provider Name/Title Dr. Mortensen   Method of Notification Phone   Request Evaluate - Remote   Notification Reason SVE;Decels;Uterine Activity   Discussed with MD BLAKE 2/60/-3. Contractions bordering on tachysystole. Variable decel x1 since last conversation.     MD orders IVF bolus x500ml and then continue at 125ml/hr. Plan is to call MD with update if contractions space out to see if MD wishes to restart po cytotect.

## 2019-05-18 NOTE — ANESTHESIA PREPROCEDURE EVALUATION
Anesthesia Pre-Procedure Evaluation    Patient: Audra Stewart   MRN: [id number] : 1985          Preoperative Diagnosis: pregnancy    Procedure(s):  PLACE ON STANDBY, SURGERY TEAM, FOR  SECTION    Past Medical History:   Diagnosis Date     Type 1 DM      Past Surgical History:   Procedure Laterality Date     None       Anesthesia Evaluation     .             ROS/MED HX    ENT/Pulmonary:  - neg pulmonary ROS     Neurologic:  - neg neurologic ROS     Cardiovascular:  - neg cardiovascular ROS       METS/Exercise Tolerance:     Hematologic:  - neg hematologic  ROS       Musculoskeletal:  - neg musculoskeletal ROS       GI/Hepatic:  - neg GI/hepatic ROS       Renal/Genitourinary:  - ROS Renal section negative       Endo:  - neg endo ROS   (+) type I DM, .      Psychiatric:  - neg psychiatric ROS       Infectious Disease: Comment: probable CPD., failure to dilate  .Lab Test        19                       2247          HGB          11.1*          No lab results found.          Malignancy:         Other:    - neg other ROS                      Physical Exam  Normal systems: cardiovascular and pulmonary    Airway   Mallampati: II    Dental     Cardiovascular   Rhythm and rate: regular and normal      Pulmonary    breath sounds clear to auscultation            Lab Results   Component Value Date    HGB 11.1 (L) 2019       Preop Vitals  BP Readings from Last 3 Encounters:   19 124/72    Pulse Readings from Last 3 Encounters:   19 76      Resp Readings from Last 3 Encounters:   19 16    SpO2 Readings from Last 3 Encounters:   19 97%      Temp Readings from Last 1 Encounters:   19 98.6  F (37  C) (Oral)    Ht Readings from Last 1 Encounters:   No data found for Ht      Wt Readings from Last 1 Encounters:   No data found for Wt    There is no height or weight on file to calculate BMI.       Anesthesia Plan      History & Physical Review  History and physical reviewed and  following examination; no interval change.    ASA Status:  2 .        Plan for Epidural   PONV prophylaxis:  Ondansetron (or other 5HT-3)       Postoperative Care  Postoperative pain management:  IV analgesics, Oral pain medications and Multi-modal analgesia.      Consents                 Edwin Jaime DO                    .

## 2019-05-19 LAB
GLUCOSE BLDC GLUCOMTR-MCNC: 117 MG/DL (ref 70–99)
GLUCOSE BLDC GLUCOMTR-MCNC: 125 MG/DL (ref 70–99)
GLUCOSE BLDC GLUCOMTR-MCNC: 144 MG/DL (ref 70–99)
GLUCOSE BLDC GLUCOMTR-MCNC: 153 MG/DL (ref 70–99)
GLUCOSE BLDC GLUCOMTR-MCNC: 184 MG/DL (ref 70–99)
GLUCOSE BLDC GLUCOMTR-MCNC: 76 MG/DL (ref 70–99)
HGB BLD-MCNC: 9.5 G/DL (ref 11.7–15.7)

## 2019-05-19 PROCEDURE — 00000146 ZZHCL STATISTIC GLUCOSE BY METER IP

## 2019-05-19 PROCEDURE — 12000000 ZZH R&B MED SURG/OB

## 2019-05-19 PROCEDURE — 25000128 H RX IP 250 OP 636: Performed by: OBSTETRICS & GYNECOLOGY

## 2019-05-19 PROCEDURE — 36415 COLL VENOUS BLD VENIPUNCTURE: CPT | Performed by: OBSTETRICS & GYNECOLOGY

## 2019-05-19 PROCEDURE — 85018 HEMOGLOBIN: CPT | Performed by: OBSTETRICS & GYNECOLOGY

## 2019-05-19 PROCEDURE — 99231 SBSQ HOSP IP/OBS SF/LOW 25: CPT | Performed by: INTERNAL MEDICINE

## 2019-05-19 PROCEDURE — 99207 ZZC MOONLIGHTING INDICATOR: CPT | Performed by: INTERNAL MEDICINE

## 2019-05-19 PROCEDURE — 25000132 ZZH RX MED GY IP 250 OP 250 PS 637: Performed by: OBSTETRICS & GYNECOLOGY

## 2019-05-19 RX ORDER — IBUPROFEN 600 MG/1
600 TABLET, FILM COATED ORAL EVERY 6 HOURS PRN
Status: DISCONTINUED | OUTPATIENT
Start: 2019-05-19 | End: 2019-05-21 | Stop reason: HOSPADM

## 2019-05-19 RX ORDER — DEXTROSE MONOHYDRATE 25 G/50ML
25-50 INJECTION, SOLUTION INTRAVENOUS
Status: DISCONTINUED | OUTPATIENT
Start: 2019-05-19 | End: 2019-05-19

## 2019-05-19 RX ORDER — NICOTINE POLACRILEX 4 MG
15-30 LOZENGE BUCCAL
Status: DISCONTINUED | OUTPATIENT
Start: 2019-05-19 | End: 2019-05-19

## 2019-05-19 RX ADMIN — KETOROLAC TROMETHAMINE 30 MG: 30 INJECTION, SOLUTION INTRAMUSCULAR at 06:12

## 2019-05-19 RX ADMIN — KETOROLAC TROMETHAMINE 30 MG: 30 INJECTION, SOLUTION INTRAMUSCULAR at 12:31

## 2019-05-19 RX ADMIN — KETOROLAC TROMETHAMINE 30 MG: 30 INJECTION, SOLUTION INTRAMUSCULAR at 00:29

## 2019-05-19 RX ADMIN — IBUPROFEN 600 MG: 600 TABLET ORAL at 19:16

## 2019-05-19 RX ADMIN — SENNOSIDES AND DOCUSATE SODIUM 1 TABLET: 8.6; 5 TABLET ORAL at 20:18

## 2019-05-19 NOTE — PHARMACY
Pharmacy: Assistance with insulin pump management    Pharmacist updated insulin pump settings for post-partum management  INSULIN PUMP - OUTPATIENT  Medtronic Pump   BASAL RATES and times:   3985-1187: 1.4 units/hour   8534-9713: 1.7 units/hour   4607-5101: 1 units/hour   1760-5188: 1.5 units/hour   2129-9836: 1.55 units/hour   9771-9841: 1.25 units/hour.     CARB RATIO and times:   4777-6229: 1:5 (1unit cover 5grams carbs)   4219-3390: 1:3.5   1764-4556:  PM:  1:4     Corection Factor (Sensitivity) and times:   4124-7209: 18 mg/dL   1621-5459: 28 mg/dL     BLOOD GLUCOSE TARGET and times:   1515-6943: 100-120   1891-8729:  90 - 100   4293-4203:  100 - 120   Active Insulin Time:  3 hours   Insulin site change due today (5/19/19)   Pt has dexcom continuous glucose monitor   [facility]    Plan: Insulin pump management resumed using post partum endocrinology pump settings. Pt will change insulin pump site today. Plan for discharge on 5/21/19

## 2019-05-19 NOTE — ANESTHESIA POSTPROCEDURE EVALUATION
Patient: Audra Stewart    * No procedures listed *    Diagnosis:* No pre-op diagnosis entered *  Diagnosis Additional Information: .Intrauterine Pregnancy, Labor      Anesthesia Type:  Epidural    Note:  Anesthesia Post Evaluation         Comments:     S/P epidural for labor.   I or my partner was immediately available for management of this patient during epidural analgesia infusion.  VSS.  Doing well. Block resolved.  Neuro at baseline. Denies positional headache. Minimal side effects easily managed w/ PRN meds. No apparent anesthetic complications. No follow-up required.    Andrew Fenton MD        Last vitals:  Vitals:    05/19/19 0000 05/19/19 0100 05/19/19 0500   BP: 124/70 133/64 97/56   Pulse:      Resp: 16 15 15   Temp: 98.7  F (37.1  C) 98.8  F (37.1  C) 98.1  F (36.7  C)   SpO2: 96% 98% 98%         Electronically Signed By: Anderw Fenton MD  May 19, 2019  7:46 AM

## 2019-05-19 NOTE — PLAN OF CARE
Patient is doing well this shift.  VS are WDL.  Her dressing is dry and intact with scant spots of old drainage.  She is only taking tordol for pain and it is well controlled as of now.  Patient has type I diabetes that she has well controlled.  The hospitalist and pharmacy are aware and plan to see her this AM to verify her pump settings and discuss her plan of care.  She has not been up to the bathroom yet this shift, but the plan is to have that done before day shift arrives.  Her output is adequate and her IV fluids have been stopped.  She is breastfeeding and using a nipple shield.  She has no questions or concerns at this time.

## 2019-05-19 NOTE — PROVIDER NOTIFICATION
05/19/19 1400   Provider Notification   Provider Name/Title Pharmacist Verena   Method of Notification Phone   Request Evaluate-Remote   Notification Reason Lab Results   Informed Verena of pre lunch blood sugar of 76. States she will come see patient

## 2019-05-19 NOTE — PROGRESS NOTES
[facility]  POD# 1    Pt doing well. Ambulating, tolerating PO. Long just discontinued. Decreased lochia. Pain controlled. Breastfeeding  Patient managing BS   Vitals:    19 2300 19 0000 19 0100 19 0500   BP: 114/59 124/70 133/64 97/56   Pulse:       Resp: 16 16 15 15   Temp: 98.7  F (37.1  C) 98.7  F (37.1  C) 98.8  F (37.1  C) 98.1  F (36.7  C)   TempSrc: Oral Oral Oral Oral   SpO2: 97% 96% 98% 98%   Weight:       Height:         Abd soft, non tender, no guarding, no rebound, adequate uterine involution  Ext no edema, no cyanosis  Dressing dry - may come off late afternoon with shower    A/P 34 year old  at 40w1d s/p LTCS POD# 1.     1) Postpartum s/p primary LTCS   -Continue routine post-partum/post-op care care.  -Hemodynamically stable    - hg 9.5  -Diet; regular  -pain Tylenol and Motrin  -Bowel ppx- Senna/docusate/simethicone  -Rubella immune  -Plan; continue routine post-partum care.  Dr. Blake  2019 7:59 AM

## 2019-05-19 NOTE — PROVIDER NOTIFICATION
05/18/19 2033   Provider Notification   Provider Name/Title Dr. Linares   Method of Notification Phone   Request Evaluate - Remote   Notification Reason Other (Comment)   Discuss with MD plan to transition off of insulin gtt to insulin pump. 1950  with change to insulin gtt of 1 unit/hour. MD states Hospitalists are to consult on transition to insulin pump. Also discuss that pt reports tylenol will result in her continuous glucometer have in accurate readings. MD orders discontinuing scheduled tylenol. Scheduled toradol and prn oxycodone is planned for pain control.    Will contact hospitalists regarding insuling pump transition.

## 2019-05-19 NOTE — PROVIDER NOTIFICATION
Data: Audra Stewart transferred to 423 via cart at 2345. Baby transferred via parent's arms.  Action: Receiving unit notified of transfer: Yes. Patient and family notified of room change. Report given to DILLON Caba at 0040. Belongings sent to receiving unit. Accompanied by Registered Nurse. Oriented patient to surroundings. Call light within reach. ID bands double-checked with receiving RN.  Response: Patient tolerated transfer and is stable.    Pt currently on her insulin pump on postpartum settings recommended by her Endocrinologist. Pt is aware to notify nursing when she gives correction/carb doses and if he has s/sx of hypoglycemia. BS after switching back to insulin pump was 184 and pt dosed per correction scale.     Patients mobililty level scored using the bedside mobility assistance tool (BMAT). Patient is at a mobility level test number: 1. Mobility equipment used: pt moved self from one bed to next. Required assist of 1 staff members. Further use of BMAT scoring required.

## 2019-05-19 NOTE — PLAN OF CARE
Has been up in room independently this shift. Denies pain -has had Toradol for pain control this shift. Has voided large amounts after taylor  catheter removed this morning. Taking po without nausea. Has managed her own insulin pump today. Audra stated she felt as though her blood sugars were low this morning. Stated that her BS was 74 at 0800 so she ate some food she brought from home. States it was like a homemade  larabar. Did not inform staff of this at the time. Blood sugar checked at 1055 and was 144 per hospital meter and 140 per hers, She declined ordering any further food for breakfast stating she would order lunch after 1200. Blood sugar at  1340 before lunch was 76. Stated she felt a little low. At 1200 her pump  increased the basal rate per MAR. Ate entire lunch. Verena from pharmacy here  at this  time and spoke with patient. Plan to check her blood sugar at 1600. See Pharmacy note. Notify hospitaist if BS's remain in the 70's  per meal.

## 2019-05-19 NOTE — PROVIDER NOTIFICATION
05/19/19 0120   Provider Notification   Provider Name/Title Pharmacist - [place]   Method of Notification Phone   Request Evaluate-Remote   Notification Reason Other   Discussed with pharmacist patient's ambulatory insulin pump and related telephone orders received by Dr. Morales (hospitalist).  Per pharmacist, okay to proceed with these orders until formal Pharmacy consult can be performed in AM.

## 2019-05-19 NOTE — PHARMACY-ADMISSION MEDICATION HISTORY
Admission medication history interview status for this patient is complete. See [facility] admission navigator for allergy information, prior to admission medications and immunization status.     Medication history interview source(s):Patient  Medication history resources (including written lists, pill bottles, clinic record):[facility] list  Primary pharmacy:     Changes made to PTA medication list:  Added: Ambulatory insulin pump program settings  Deleted: asa  Changed: -    Actions taken by pharmacist (provider contacted, etc):None     Additional medication history information:None    Medication reconciliation/reorder completed by provider prior to medication history? No    Do you take OTC medications (eg tylenol, ibuprofen, fish oil, eye/ear drops, etc)? N(Y/N)    For patients on insulin therapy: N (Y/N)  See pump program below    Prior to Admission medications    Medication Sig Last Dose Taking? Auth Provider   insulin lispro (HUMALOG) 100 UNIT/ML injection by Device route See Admin Instructions 5/16/2019 at Unknown time Yes Reported, Patient   INSULIN PUMP - OUTPATIENT Inject Subcutaneous See Admin Instructions Date last updated:  5/19/19  Medtronic Pump  BASAL RATES and times:  7049-1840: 1.4 units/hour   4310-6663: 1.7 units/hour   8245-4876: 1 units/hour   5603-3887: 1.5 units/hour   0635-8101: 1.55 units/hour  1832-9610: 1.25 units/hour.  CARB RATIO and times:  9660-9070: 1:5 (1unit cover 5grams carbs)  7973-0395: 1:3.5  8467-9951:  PM:  1:4  Corection Factor (Sensitivity) and times:  7982-5397: 18 mg/dL  8629-8590: 28 mg/dL  BLOOD GLUCOSE TARGET and times:  3502-7285: 100-120  3865-1522:  90 - 100  3866-4014:  100 - 120  Active Insulin Time:  3 hours  Insulin site change due today (5/19/19)  Pt has dexcom continuous glucose monitor  [facility]  Yes Unknown, Entered By History   Prenatal Vit-Fe Fumarate-FA (PNV PRENATAL PLUS MULTIVITAMIN) 27-1 MG TABS per tablet Take 1 tablet by mouth daily 5/16/2019  at Unknown time Yes Reported, Patient

## 2019-05-19 NOTE — PROVIDER NOTIFICATION
19   Provider Notification   Provider Name/Title Dr. Morales   Method of Notification Phone   Request Evaluate - Remote   Notification Reason Other (Comment)   Discussed with hospitalist that pt was on insulin pump before active labor and is now on insulin gtt at 1unit/hr.  . Discuss pt discussed postpartum insulin pump settings with her Endocrinologist. Also discussed  patients typically get D5LR post-op. Pt is alert, oriented, and appropriate for managing pump.    Per pt her settings are:   BASAL  0838-0782: 1.4 units/hr  4689-2557: 1.7 units/hr  0954-8321: 1 unit/hr  8743-0392: 1.5 units/hr  8827-0771: 1.55 units/hr  3975-5043: 1.25 units/hr    CARBS  3714-6521: 1 unit per 5 grams of carbs  7703-5495: 1 unit per 3.5 grams of carbs    CORRECTION (target range )  6332-1483: 1 unit for every 18 points over target range   9842-9006: 1 unit for every 28 points over target range    MD orders pt's postpartum pump settings per her Endo. MD gives instruction to start insulin pump at same time as insulin gtt being stopped. Discontinues D5LR. Blood sugars every 4 hours. Hospitalist MD will round on pt tomorrow.

## 2019-05-19 NOTE — PROGRESS NOTES
[facility]  Hospitalist Progress Note for 2019:          Assessment and Plan:   Ms. Stewart is a 34-year-old female  at 39-w+6 d pregnant admitted for induction of labor secondary to type 1 diabetes mellitus medicine team consulted to manage diabetes.  Patient in active labor.  Started on IV insulin infusion.  Prior to induction of labor she was using insulin pump.     S/p C- section on 2019:  - postop care per OB/GYN    Type 1 diabetes mellitus:  --Prior to induction of labor she was on insulin pump.  She did have frequent hypoglycemia with blood glucose down to 50s.  - was on IV insulin during active labor until this morning  - BS this morning 400's while on LR + tolerating po diet  - Insulin pump resumed with help of pharmacy- consulted.Rewiewed last Endocrine notes (recommendations on dosing for resuming insulin pump post partum).  - we will need to closely monitor for  hypoglycemia & adjust insulin pump      DVT precaution: ambulating   Disposition: plan per primary team.    Emeirta Stovall MD.  Hospitalist [phone] (7am -6 pm)                 Interval History:   No new c/o.               Medications:       insulin bolus from AMBULATORY PUMP   Subcutaneous 4x Daily AC & HS     insulin bolus from AMBULATORY PUMP   Subcutaneous TID AC     insulin lispro   Device See Admin Instructions     ketorolac  30 mg Intravenous Q6H     measles, mumps and rubella vaccine  0.5 mL Subcutaneous Once     sodium chloride (PF)  3 mL Intracatheter Q8H     Tdap (tetanus-diphtheria-acell pertussis)  0.5 mL Intramuscular Once     albuterol, [START ON 2019] bisacodyl, glucose **OR** dextrose **OR** glucagon, hydrocortisone, [START ON 2019] ibuprofen, lactated ringers, lanolin, naloxone, NO Rho (D) immune globulin (RhoGam) needed - mother Rh POSITIVE, ondansetron, oxyCODONE, oxytocin in 0.9% NaCl, oxytocin, senna-docusate **OR** senna-docusate, simethicone, sodium chloride (PF), [START ON  "2019] sodium phosphate, sterile water (bottle), Tranexamic Acid               Physical Exam:   Blood pressure 97/56, pulse 76, temperature 98.1  F (36.7  C), temperature source Oral, resp. rate 15, height 1.702 m (5' 7\"), weight 95.3 kg (210 lb), SpO2 98 %.  Wt Readings from Last 4 Encounters:   19 95.3 kg (210 lb)         Vital Sign Ranges  Temperature Temp  Av.5  F (36.9  C)  Min: 98.1  F (36.7  C)  Max: 98.8  F (37.1  C)   Blood pressure Systolic (24hrs), Av , Min:97 , Max:133        Diastolic (24hrs), Av, Min:55, Max:72      Pulse No data recorded   Respirations Resp  Av.1  Min: 15  Max: 18   Pulse oximetry SpO2  Av.9 %  Min: 84 %  Max: 100 %         Intake/Output Summary (Last 24 hours) at 2019 0948  Last data filed at 2019 0700  Gross per 24 hour   Intake 1598.85 ml   Output 2182 ml   Net -583.15 ml       Constitutional: Awake, alert, cooperative, no apparent distress   Lungs: Clear to auscultation bilaterally, no crackles or wheezing   Cardiovascular: Regular rate and rhythm, normal S1 and S2, and no murmur noted   Abdomen: Normal bowel sounds, soft, non-distended, non-tender   Skin: No rashes, no cyanosis, no edema          Data:   All laboratory data reviewed    "

## 2019-05-20 LAB
GLUCOSE BLDC GLUCOMTR-MCNC: 100 MG/DL (ref 70–99)
GLUCOSE BLDC GLUCOMTR-MCNC: 114 MG/DL (ref 70–99)
GLUCOSE BLDC GLUCOMTR-MCNC: 207 MG/DL (ref 70–99)
GLUCOSE BLDC GLUCOMTR-MCNC: 78 MG/DL (ref 70–99)
GLUCOSE BLDC GLUCOMTR-MCNC: 89 MG/DL (ref 70–99)

## 2019-05-20 PROCEDURE — 12000000 ZZH R&B MED SURG/OB

## 2019-05-20 PROCEDURE — 99207 ZZC MOONLIGHTING INDICATOR: CPT | Performed by: INTERNAL MEDICINE

## 2019-05-20 PROCEDURE — 00000146 ZZHCL STATISTIC GLUCOSE BY METER IP

## 2019-05-20 PROCEDURE — 99231 SBSQ HOSP IP/OBS SF/LOW 25: CPT | Performed by: INTERNAL MEDICINE

## 2019-05-20 PROCEDURE — 25000132 ZZH RX MED GY IP 250 OP 250 PS 637: Performed by: OBSTETRICS & GYNECOLOGY

## 2019-05-20 RX ADMIN — IBUPROFEN 600 MG: 600 TABLET ORAL at 13:39

## 2019-05-20 RX ADMIN — IBUPROFEN 600 MG: 600 TABLET ORAL at 01:20

## 2019-05-20 RX ADMIN — IBUPROFEN 600 MG: 600 TABLET ORAL at 07:39

## 2019-05-20 RX ADMIN — SENNOSIDES AND DOCUSATE SODIUM 1 TABLET: 8.6; 5 TABLET ORAL at 10:15

## 2019-05-20 RX ADMIN — IBUPROFEN 600 MG: 600 TABLET ORAL at 19:29

## 2019-05-20 NOTE — PLAN OF CARE
VSS. Incision healing well.  Denies needing any pain meds at this time.  Independent with cares.  Meeting expected goals.

## 2019-05-20 NOTE — PROGRESS NOTES
[facility]stetric[facility] Post-Op / Progress Note    Interval History   Doing well.  Pain is well-controlled.  No fevers.  No history of wound drainage, warmth or significant erythema.  Good appetite.  Denies chest pain, shortness of breath, nausea or vomiting.  Ambulatory.  Breastfeeding well. Blood sugars improved overnight (no lows).     Medications     insulin basal rate for inpatient ambulatory pump 1 Units/hr (05/20/19 0638)     NO Rho (D) immune globulin (RhoGam) needed - mother Rh POSITIVE       oxytocin in 0.9% NaCl         insulin bolus from AMBULATORY PUMP   Subcutaneous 4x Daily AC & HS     insulin bolus from AMBULATORY PUMP   Subcutaneous TID AC     insulin lispro   Device See Admin Instructions     measles, mumps and rubella vaccine  0.5 mL Subcutaneous Once     Tdap (tetanus-diphtheria-acell pertussis)  0.5 mL Intramuscular Once       Physical Exam   Temp: 98.6  F (37  C) Temp src: Oral BP: 127/69 Pulse: 78   Resp: 14 SpO2: 98 %      Vitals:    05/18/19 2015   Weight: 95.3 kg (210 lb)     Vital Signs with Ranges  Temp:  [98.6  F (37  C)-99  F (37.2  C)] 98.6  F (37  C)  Pulse:  [73-81] 78  Resp:  [14-16] 14  BP: (122-138)/(67-73) 127/69  SpO2:  [98 %] 98 %  I/O last 3 completed shifts:  In: -   Out: 1550 [Urine:1550]    Uterine fundus is firm, non-tender and at the level of the umbilicus  Incision covered with bandage   Extremities Non-tender, +1 edema     Data   Recent Labs   Lab Test 05/16/19 2247   ABO O  O   RH Pos  Pos   AS Neg     Recent Labs   Lab Test 05/19/19  0614 05/16/19 2247   HGB 9.5* 11.1*     No lab results found.    Assessment & Plan   1. POD #2 s/p LTCS for failure to progres   -continue oral pain medication ibuprofen routine and oxycodone PRN   -remove bandage after shower today   -encourage ambulation   -lactation consult today   -plan on discharge to home tomorrow   2. Type I DM   -continue with insulin pump and hospitalist consulted    -will need follow up with  endocrine postpartum     Beatriz Fajardo,

## 2019-05-20 NOTE — PLAN OF CARE
Pt bonding well with baby. Able to ambulate independently and is voiding. Pt is type 1 diabetic with insulin pump, basal rate is running. Hospitalist has been involved in insulin plan, basal rate did not need to be adjusted at 1600 since blood sugar was above 70s. Pt is carb counting, correcting blood sugars independently with pump and will notify nurse of correction amount for charting. Blood sugars are beign taken before meals and at Hs. Pts IV is out, infiltrated when flushing for toradol. Last dose of toradol was not able to be given, PO ibuprofen started. Pt cannot take tylenol due to insulin pump. Pain has been tolerable with ibuprofen. Pt has history of bicuspid murmur, and has celiac. Pt is breastfeeding infant, educated importance of hand expression and pumping after each feed and to feed baby remainder of milk expressed for blood sugar maintenance and help with high bilirubin results.

## 2019-05-20 NOTE — LACTATION NOTE
This note was copied from a baby's chart.  LC visit. Her baby has been struggling to latch well.  She is using the nipple shield due to flat nipples.  Infant was also started on phototherapy, bili blanket.  LC assisted with latch and positioning.  Good latch obtained in the football hold on the left with active sucking and swallowing.  Patient was pleased with progress.  Breast compressions were used to maximize nutritive sucking.  Plan for continued support and follow up.

## 2019-05-20 NOTE — PROGRESS NOTES
"[facility]  Hospitalist Progress Note for 2019:          Assessment and Plan:   Ms. Stewart is a 34-year-old female  at 39-w+6 d pregnant admitted for induction of labor secondary to type 1 diabetes mellitus medicine team consulted to manage diabetes.  Patient in active labor.  Started on IV insulin infusion.  Prior to induction of labor she was using insulin pump.     S/p C- section on 2019:  - postop care per OB/GYN    Type 1 diabetes mellitus:  --Prior to induction of labor she was on insulin pump.   - was on IV insulin during active labor until this morning  - BS look good on current settings of Insulin pump resumed with help of pharmacy  - continue same & f/u with her Endocrinology after discharge from hospital  - we will need to closely monitor for  hypoglycemia & adjust insulin pump      DVT precaution: ambulating   Disposition: plan per primary team.Pt to continue current Insulin via insulin pump as she was advised by her Endocrinologist.    Emerita Stovall MD.  Hospitalist [phone] (7am -6 pm)                 Interval History:   No new c/o.               Medications:       insulin bolus from AMBULATORY PUMP   Subcutaneous 4x Daily AC & HS     insulin bolus from AMBULATORY PUMP   Subcutaneous TID AC     insulin lispro   Device See Admin Instructions     albuterol, bisacodyl, glucose **OR** dextrose **OR** glucagon, hydrocortisone, ibuprofen, lactated ringers, lanolin, naloxone, NO Rho (D) immune globulin (RhoGam) needed - mother Rh POSITIVE, ondansetron, oxyCODONE, oxytocin in 0.9% NaCl, oxytocin, senna-docusate **OR** senna-docusate, simethicone, sodium chloride (PF), sodium phosphate, sterile water (bottle), Tranexamic Acid               Physical Exam:   Blood pressure 128/76, pulse 83, temperature 98  F (36.7  C), temperature source Oral, resp. rate 18, height 1.702 m (5' 7\"), weight 95.3 kg (210 lb), SpO2 98 %.  Wt Readings from Last 4 Encounters:   19 95.3 kg (210 " lb)         Vital Sign Ranges  Temperature Temp  Av.5  F (36.9  C)  Min: 98.1  F (36.7  C)  Max: 98.8  F (37.1  C)   Blood pressure Systolic (24hrs), Av , Min:97 , Max:133        Diastolic (24hrs), Av, Min:55, Max:72      Pulse No data recorded   Respirations Resp  Av.1  Min: 15  Max: 18   Pulse oximetry SpO2  Av.9 %  Min: 84 %  Max: 100 %         Intake/Output Summary (Last 24 hours) at 2019 0948  Last data filed at 2019 0700  Gross per 24 hour   Intake 1598.85 ml   Output 2182 ml   Net -583.15 ml       Constitutional: Appears fatigued awake, alert, cooperative, no apparent distress   Lungs: Clear to auscultation bilaterally, no crackles or wheezing   Cardiovascular: Regular rate and rhythm, normal S1 and S2, and no murmur noted   Abdomen: Normal bowel sounds, soft, non-distended, non-tender   Skin: No rashes, no cyanosis, no edema          Data:   All laboratory data reviewed

## 2019-05-20 NOTE — PLAN OF CARE
Data: Vital signs within normal limits. Postpartum checks within normal limits - see flow record. Patient eating and drinking normally. Patient able to empty bladder independently and is up ambulating. No apparent signs of infection. Incision healing well. Patient performing self cares and is able to care for infant.  Action: Patient medicated during the shift for pain and cramping. See MAR. Patient reassessed within 1 hour after each medication and pain was improved - patient stated she was comfortable. Patient education done about infant cares and self cares. See flow record.  Response: Positive attachment behaviors observed with infant. Support persons  present.   Plan: Anticipate discharge on 5/21/19.

## 2019-05-20 NOTE — PLAN OF CARE
Pt meeting expected outcomes. Vitals stable. Fundus firm and midline. Denies difficulty voiding. Incision with island dressing. Pain controlled with ibuprofen, has not needed oxycodone this shift. Independent with self cares. [place] sleepy at the breast at times, using shield due to flat nipples. Pumping after feeds and giving baby back EBM due to previously being on blood sugar checks and TSB high risk. Nipples are becoming sore, using mothers love cream. Pt has insulin pump with blood sugars monitored ac/hs and 0200.

## 2019-05-21 VITALS
TEMPERATURE: 98.1 F | RESPIRATION RATE: 16 BRPM | SYSTOLIC BLOOD PRESSURE: 137 MMHG | BODY MASS INDEX: 32.96 KG/M2 | DIASTOLIC BLOOD PRESSURE: 79 MMHG | HEIGHT: 67 IN | HEART RATE: 73 BPM | WEIGHT: 210 LBS | OXYGEN SATURATION: 98 %

## 2019-05-21 LAB
GLUCOSE BLDC GLUCOMTR-MCNC: 104 MG/DL (ref 70–99)
GLUCOSE BLDC GLUCOMTR-MCNC: 121 MG/DL (ref 70–99)
GLUCOSE BLDC GLUCOMTR-MCNC: 166 MG/DL (ref 70–99)
GLUCOSE BLDC GLUCOMTR-MCNC: 62 MG/DL (ref 70–99)

## 2019-05-21 PROCEDURE — 00000146 ZZHCL STATISTIC GLUCOSE BY METER IP

## 2019-05-21 PROCEDURE — 25000132 ZZH RX MED GY IP 250 OP 250 PS 637: Performed by: OBSTETRICS & GYNECOLOGY

## 2019-05-21 RX ADMIN — IBUPROFEN 600 MG: 600 TABLET ORAL at 08:14

## 2019-05-21 RX ADMIN — IBUPROFEN 600 MG: 600 TABLET ORAL at 02:25

## 2019-05-21 NOTE — PLAN OF CARE
Pt up and margarito. Voiding without difficulty. Bonding well with baby and responding to cues. Breastfeeding on demand with nipple shield (as pt has inverted nipples), pumping, along with supplementing as needed as baby has high risk bili. Ibuprofen effective for pain management. Fundus firm and midline. Incision site intacted with steri-stripes. PPD completed. BC and videos to be completed prior to babies discharge. No pump needed per pt. Education completed. Discharge instructions explained and all questions and concerns answered. 1 week and 6 week post partum follow-up per MD. Adequate for discharge. Discharged at 1300, will room and board as baby is staying another night.    Beatriz Tubbs

## 2019-05-21 NOTE — PLAN OF CARE
Pt up ad margarito in room. Voiding without difficulty. She has been independent with self and  cares. Breastfeeding going well. Pumping and supplementing baby with EBM. Taking Ibuprofen for pain. Pt checked blood sugar with her own machine at 1910 = 61 (she forgot to call the nurse for blood sugar check). Nurse rechecked BS 10 minutes after patient ate dinner = 78. Insulin was not given before dinner.  present and supportive. Anticipate discharge on 2019.

## 2019-05-21 NOTE — PROGRESS NOTES
[facility]  POD# 3    Pt doing well. Ambulating, voiding, tolerating PO. Decreased lochia. Pain controlled. Breastfeeding and breast pumping, coping well with infant. Pt currently being seen by Lactation services in order to have further advice for proper latching. Baby is staying one more night per peds.    Vitals:    19 0910 19 1800 19 0225 19 0900   BP: 128/76 131/73 123/76 137/79   Pulse: 83 80 69 73   Resp: 18 18 16 16   Temp: 98  F (36.7  C) 98.6  F (37  C) 97.7  F (36.5  C) 98.1  F (36.7  C)   TempSrc: Oral Oral Oral Oral   SpO2:       Weight:       Height:         Abd soft, NTGR, adequate uterine involution, no fundal tenderness, incision is well approached with stitches and steri strips in place, no signs of infection, no signs of hematoma or seroma, healing well  Ext no edema, no cyanosis, pulses +    Results for LILLIANA STAPLETON (MRN [id number]) as of 2019 10:30   Ref. Range 2019 22:03 2019 02:21 2019 03:04 2019 05:20 2019 08:13   Glucose Latest Ref Range: 70 - 99 mg/dL 207 (H) 62 (L) 104 (H) 121 (H) 166 (H)     Lab Results   Component Value Date    HGB 9.5 2019       A/P 34 year old  at 40w0d s/p LTCS POD# 3, failed induction. Pregnancy complicated by T1DM (insulin pump, well controlled)    1) Postpartum s/p pLTCS  -Continue routine post-partum/post-op care care.  -Hemodynamically stable    - continue PNVs  -Diet; regular  -pain Tylenol / motrin / oxycodone for breatkthrough  -Bowel ppx- Senna/docusate/simethicone  -Rubella immune  -Tdap given  -Rh pos  -Breast feeding, breast pump provided  -BC; pt wants to discuss this further at her 6 wk PP visit  -Plan; continue routine post-partum care.  -D/c home today, pt will stay boarded overnight  -1 wk post-op check visit  -6 wk PP visit    Dr. Deepak Mortensen  [phone]  2019 10:30 AM

## 2019-05-21 NOTE — DISCHARGE SUMMARY
[facility]    Discharge Summary  Obstetrics    Date of Admission:  2019  Date of Discharge:  2019  Discharging Provider: Sejal Mortensen    Discharge Diagnoses   Patient Active Problem List   Diagnosis     Indication for care in labor or delivery     Diabetes mellitus affecting pregnancy     Failure to progress in labor     Gross hematuria     Failed induction of labor       History of Present Illness   Audra Stewart is a 34 year old female now  who presented to L&D @ 40w0d GA. Her pregnancy has been complicated by T1DM on insulin well controlled. Please see her admit H&P for full details of her PMH, PSH, Meds, Allergies and exam on admit.    Hospital Course   The patient had a p-LTCS @ 40w0d, please see her delivery summary for full details.     Her postpartum course was uncomplicated. On postpartum day 3, she was meeting all of her postpartum goals and deemed stable for discharge. She was voiding without difficulty, tolerating a regular diet without nausea and vomiting, her pain was well controlled on oral pain medicines and her lochia was appropriate.    Hgb:   Lab Results   Component Value Date    HGB 9.5 2019    HGB 11.1 2019       Lab Results   Component Value Date    RH Pos 2019    RH Pos 2019    and rhogam was not given    Contraception was discussed and will be addressed at her postpartum appointment.    Instructions:  1) Call for temperature greater than 100.4F, foul smelling vaginal discharge, bleeding more than 1 pad per hour for 2 hrs, pain not controlled by oral pain meds, severe constipation or severe nausea or vomiting.  2) She was instructed to follow-up with her primary OB in 6 weeks for a routine postpartum visit  - 1 week post-op office visit  3) She was instructed to continue her PNV on discharge if she wished to breast feed her infant.    Sejal Mortensen MD    Discharge Disposition   Discharged to home   Condition at discharge:  Satisfactory    Primary Care Physician   Jimmy Hui    Consultations This Hospital Stay   HOSPITALIST IP CONSULT  HOSPITALIST IP CONSULT  ANESTHESIOLOGY IP CONSULT  HOME CARE POST PARTUM/ IP CONSULT  LACTATION IP CONSULT    Discharge Orders   No discharge procedures on file.  Discharge Medications   Current Discharge Medication List      CONTINUE these medications which have NOT CHANGED    Details   insulin lispro (HUMALOG) 100 UNIT/ML injection by Device route See Admin Instructions      INSULIN PUMP - OUTPATIENT Inject Subcutaneous See Admin Instructions Date last updated:  19  Medtronic Pump  BASAL RATES and times:  3315-8484: 1.4 units/hour   2716-4375: 1.7 units/hour   1912-4119: 1 units/hour   5131-3689: 1.5 units/hour   5272-5539: 1.55 units/hour  2009-4597: 1.25 units/hour.  CARB RATIO and times:  0952-7139: 1:5 (1unit cover 5grams carbs)  5102-5547: 1:3.5  7240-0088:  PM:  1:4  Corection Factor (Sensitivity) and times:  1307-6908: 18 mg/dL  6850-7666: 28 mg/dL  BLOOD GLUCOSE TARGET and times:  5518-2535: 100-120  4942-6601:  90 - 100  1889-1196:  100 - 120  Active Insulin Time:  3 hours  Insulin site change due today (19)  Pt has dexcom continuous glucose monitor  [facility]      Prenatal Vit-Fe Fumarate-FA (PNV PRENATAL PLUS MULTIVITAMIN) 27-1 MG TABS per tablet Take 1 tablet by mouth daily           Allergies   Allergies   Allergen Reactions     Gluten Meal      Unstable glucoses with gluten

## 2019-05-21 NOTE — PLAN OF CARE
Ind with self and baby cares with help from supportive FOB at the bedside. Demonstrates competency in managing inulin pump, BG 62 asymptomatic at 0200 check, pt treated with 30 grams carbohydrates, BG recheck 104. Managing pain with ibuprofen per emar, knows tylenol is available as well. Bonding well with baby.

## 2019-05-21 NOTE — PLAN OF CARE
Meeting goals for age. Remains under bili blanket, TSB recheck 0600. Has voided and stooled this shift. Breastfeeding poorly, Mom desiring to try without nipple shield tonight, supplementing with EBM 5-10 ml each feed. Bonding well with parents.

## 2019-05-21 NOTE — DISCHARGE INSTRUCTIONS
"Postpartum Discharge Instructions   CONGRATULATIONS!   ACTIVITY:   - You may ride in a car, but no driving for 1-2 weeks.   - Do not lift anything heavier than your baby for 6 weeks.   - You may slowly go up and down stairs as you feel able.   - Resume other exercises after 6 weeks.   - Rest when your baby is sleeping.   - Call your doctor if you are feeling \"blue\" for more than 2 weeks.   - Call your doctor immediately or go the Emergency Center if you think you might hurt yourself or your baby.     HYGIENE:   - You may take a tub bath or shower.   - Continue using a janell bottle or sitz bath for comfort or cleanliness.   - No douching or tampon use until after 6 week checkup.     DIET:   - Wait 6 weeks before dieting to lose weight.   - Aim for gradual weight loss through healthy eating habits.   - Take a vitamin daily unless otherwise directed.     BREASTFEEDING:   - Refer to Breastfeeding Guidelines to Lactation or call [phone]    MEDICATIONS:   - Use as directed on prescription.     PAIN MANAGEMENT: as prescribed     Breast Care:   - If not breastfeeding, apply ice packs to your breasts 3 times per day for 15 minutes. Wear a tight bra for at least one week.   - If breastfeeding, nurse often to get relief, pain medication as directed.     Episiotomy:   - Continue use of janell bottle and sitz bath as directed.   - Use Dermoplast and/or Tucks as directed.      Incision:   - Splint incision when moving and turning.   - Medications as instructed.     SPECIAL INFORMATION:   - No sexual intercourse for 6 weeks. After that, use a barrier contraception until your doctor tells you it is ok to use something different.   - Breastfeeding is not a method of birth control.   - You may have a period while breastfeeding. Your first period may come 4 to 10 weeks after delivery, or later if you are breastfeeding.   - Avoid constipation. Drink plenty of water, eat vegetables and fruits high in natural fiber,  high grain " breads and cereals. You may use a stool softener as necessary.     COMPLICATIONS:   Call you doctor if any of the following occur:   - Continuing bright red vaginal bleeding or clots larger than a lemon.   - Pain or redness in the breasts.   - Fever over 100.4 when temperature is taken by mouth.   - Burning feeling with urination.   - Bad smelling vaginal drainage.   - Incision or episiotomy pulls apart, is red or has drainage.    Postop  Birth Instructions    Please follow-up in 1 week and again at  6 Weeks with your OBGYN      LACTATION: [phone]    HOME CARE: [phone]    Activity       Do not lift more than 10 pounds for 6 weeks after surgery.  Ask family and friends for help when you need it.    No driving until you have stopped taking your pain medications (usually two weeks after surgery).    No heavy exercise or activity for 6 weeks.  Don't do anything that will put a strain on your surgery site.    Don't strain when using the toilet.  Your care team may prescribe a stool softener if you have problems with your bowel movements.     To care for your incision:       Keep the incision clean and dry.    Do not soak your incision in water. No swimming or hot tubs until it has fully healed. You may soak in the bathtub if the water level is below your incision.    Do not use peroxide, gel, cream, lotion, or ointment on your incision.    Adjust your clothes to avoid pressure on your surgery site (check the elastic in your underwear for example).     You may see a small amount of clear or pink drainage and this is normal.  Check with your health care provider:       If the drainage increases or has an odor.    If the incision reddens, you have swelling, or develop a rash.    If you have increased pain and the medicine we prescribed doesn't help.    If you have a fever above 100.4 F (38 C) with or without chills when placing thermometer under your tongue.   The area around your incision (surgery wound),  will feel numb.  This is normal. The numbness should go away in less than a year.     Keep your hands clean:  Always wash your hands before touching your incision (surgery wound). This helps reduce your risk of infection. If your hands aren't dirty, you may use an alcohol hand-rub to clean your hands. Keep your nails clean and short.    Call your healthcare provider if you have any of these symptoms:       You soak a sanitary pad with blood within 1 hour, or you see blood clots larger than a golf ball.    Bleeding that lasts more than 6 weeks.    Vaginal discharge that smells bad.    Severe pain, cramping or tenderness in your lower belly area.    A need to urinate more frequently (use the toilet more often), more urgently (use the toilet very quickly), or it burns when you urinate.    Nausea and vomiting.    Redness, swelling or pain around a vein in your leg.    Problems breastfeeding or a red or painful area on your breast.    Chest pain and cough or are gasping for air.    Problems with coping with sadness, anxiety or depression. If you have concerns about hurting yourself or the baby, call your provider immediately.      You have questions or concerns after you return home.

## 2019-05-21 NOTE — PROGRESS NOTES
Public Health Nurse (PHN) met with patient, discussed resources within [facility].  Provided [facility] Public Health services resource card, home visiting card, community resource guide and car seat information card given and discussed. Ed on Follow Along Program. Family is aware how to add baby to insurance and have a primary provider arranged for baby.  Offered referral for home visiting, family declined at this time. Patient declined any questions or concerns.

## 2019-05-21 NOTE — LACTATION NOTE
This note was copied from a baby's chart.  LC follow up. Her baby has been under double phototherapy for jaundice.  She reports no good latching since yesterday.  LC offered assistance, a shield is indicated for flat nipples, however she did not want to use the shield.  LC assisted with latch assist, sandwiching breast tissue and both breasts, but no latch without the shield.  LC reviewed reasons why a nipple shield might be helpful, reviewed barriers to breastfeeding, and was able to agree to trial with the shield again. Nipple shield size increased to 24 mm for better comfort and fit.  Good suck pattern obtained and swallows.  Her milk is transitioning.  She was encouraged to use the shield for each feeding due to infant medical need at this time.  LC will also continue to follow and assist with positioning prn; and will encourage independence with holds.  Infant will continue with post feed supplementation of EBM due to jaundice levels.

## 2019-05-22 ENCOUNTER — LACTATION ENCOUNTER (OUTPATIENT)
Age: 34
End: 2019-05-22

## 2019-05-22 NOTE — LACTATION NOTE
This note was copied from a baby's chart.  LC follow up.  Parents feel confident with latching and positioning today.  No concerns noted.  Phototherapy was discontinued.  Plan for discharge and feeds include continuing with offering both sides, feeding on demand and at least every 3 hours, and pumping and offering any additional EBM.  May begin to wean off pumped milk post feeds after follow up in clinic with Peds tomorrow or as advised by Pediatrician.

## 2019-05-29 ENCOUNTER — TELEPHONE (OUTPATIENT)
Dept: OBGYN | Facility: CLINIC | Age: 34
End: 2019-05-29

## 2021-08-11 LAB
HEPATITIS B SURFACE ANTIGEN (EXTERNAL): NEGATIVE
HIV1+2 AB SERPL QL IA: NEGATIVE

## 2021-08-12 LAB — RUBELLA ANTIBODY IGG (EXTERNAL): NORMAL

## 2022-02-15 DIAGNOSIS — Z11.59 ENCOUNTER FOR SCREENING FOR OTHER VIRAL DISEASES: Primary | ICD-10-CM

## 2022-02-16 RX ORDER — ASPIRIN 81 MG/1
81 TABLET, CHEWABLE ORAL DAILY
Status: ON HOLD | COMMUNITY
End: 2022-02-24

## 2022-02-18 ENCOUNTER — LAB (OUTPATIENT)
Dept: LAB | Facility: CLINIC | Age: 37
End: 2022-02-18
Attending: OBSTETRICS & GYNECOLOGY
Payer: COMMERCIAL

## 2022-02-18 DIAGNOSIS — Z11.59 ENCOUNTER FOR SCREENING FOR OTHER VIRAL DISEASES: ICD-10-CM

## 2022-02-18 PROCEDURE — U0005 INFEC AGEN DETEC AMPLI PROBE: HCPCS

## 2022-02-18 PROCEDURE — U0003 INFECTIOUS AGENT DETECTION BY NUCLEIC ACID (DNA OR RNA); SEVERE ACUTE RESPIRATORY SYNDROME CORONAVIRUS 2 (SARS-COV-2) (CORONAVIRUS DISEASE [COVID-19]), AMPLIFIED PROBE TECHNIQUE, MAKING USE OF HIGH THROUGHPUT TECHNOLOGIES AS DESCRIBED BY CMS-2020-01-R: HCPCS

## 2022-02-19 LAB — SARS-COV-2 RNA RESP QL NAA+PROBE: NEGATIVE

## 2022-02-20 ENCOUNTER — ANESTHESIA EVENT (OUTPATIENT)
Dept: SURGERY | Facility: CLINIC | Age: 37
End: 2022-02-20
Payer: COMMERCIAL

## 2022-02-20 ENCOUNTER — LAB (OUTPATIENT)
Dept: LAB | Facility: CLINIC | Age: 37
End: 2022-02-20
Payer: COMMERCIAL

## 2022-02-20 DIAGNOSIS — Z01.812 PRE-OPERATIVE LABORATORY EXAMINATION: ICD-10-CM

## 2022-02-20 LAB
ABO/RH(D): NORMAL
ANTIBODY SCREEN: NEGATIVE
CREAT SERPL-MCNC: 0.66 MG/DL (ref 0.52–1.04)
GFR SERPL CREATININE-BSD FRML MDRD: >90 ML/MIN/1.73M2
HGB BLD-MCNC: 9.1 G/DL (ref 11.7–15.7)
SPECIMEN EXPIRATION DATE: NORMAL
T PALLIDUM AB SER QL: NONREACTIVE

## 2022-02-20 PROCEDURE — 86850 RBC ANTIBODY SCREEN: CPT

## 2022-02-20 PROCEDURE — 86901 BLOOD TYPING SEROLOGIC RH(D): CPT

## 2022-02-20 PROCEDURE — 36415 COLL VENOUS BLD VENIPUNCTURE: CPT

## 2022-02-20 PROCEDURE — 85018 HEMOGLOBIN: CPT

## 2022-02-20 PROCEDURE — 82565 ASSAY OF CREATININE: CPT

## 2022-02-20 PROCEDURE — 86780 TREPONEMA PALLIDUM: CPT

## 2022-02-21 ENCOUNTER — HOSPITAL ENCOUNTER (INPATIENT)
Facility: CLINIC | Age: 37
LOS: 3 days | Discharge: HOME | End: 2022-02-24
Attending: OBSTETRICS & GYNECOLOGY | Admitting: OBSTETRICS & GYNECOLOGY
Payer: COMMERCIAL

## 2022-02-21 ENCOUNTER — ANESTHESIA (OUTPATIENT)
Dept: SURGERY | Facility: CLINIC | Age: 37
End: 2022-02-21
Payer: COMMERCIAL

## 2022-02-21 DIAGNOSIS — Z98.891 S/P REPEAT LOW TRANSVERSE C-SECTION: Primary | ICD-10-CM

## 2022-02-21 LAB
ALT SERPL W P-5'-P-CCNC: 23 U/L (ref 0–50)
AST SERPL W P-5'-P-CCNC: 31 U/L (ref 0–45)
CREAT UR-MCNC: 352 MG/DL
GLUCOSE BLD-MCNC: 86 MG/DL (ref 70–99)
HGB BLD-MCNC: 9.1 G/DL (ref 11.7–15.7)
PROT UR-MCNC: 7.32 G/L
PROT/CREAT 24H UR: 2.08 G/G CR (ref 0–0.2)
T PALLIDUM AB SER QL: NONREACTIVE

## 2022-02-21 PROCEDURE — 258N000003 HC RX IP 258 OP 636: Performed by: OBSTETRICS & GYNECOLOGY

## 2022-02-21 PROCEDURE — 272N000001 HC OR GENERAL SUPPLY STERILE: Performed by: OBSTETRICS & GYNECOLOGY

## 2022-02-21 PROCEDURE — 370N000017 HC ANESTHESIA TECHNICAL FEE, PER MIN: Performed by: OBSTETRICS & GYNECOLOGY

## 2022-02-21 PROCEDURE — 250N000011 HC RX IP 250 OP 636: Performed by: ANESTHESIOLOGY

## 2022-02-21 PROCEDURE — 710N000009 HC RECOVERY PHASE 1, LEVEL 1, PER MIN: Performed by: OBSTETRICS & GYNECOLOGY

## 2022-02-21 PROCEDURE — 88307 TISSUE EXAM BY PATHOLOGIST: CPT | Mod: TC | Performed by: OBSTETRICS & GYNECOLOGY

## 2022-02-21 PROCEDURE — 82947 ASSAY GLUCOSE BLOOD QUANT: CPT | Performed by: OBSTETRICS & GYNECOLOGY

## 2022-02-21 PROCEDURE — 99222 1ST HOSP IP/OBS MODERATE 55: CPT | Performed by: INTERNAL MEDICINE

## 2022-02-21 PROCEDURE — 86780 TREPONEMA PALLIDUM: CPT | Performed by: OBSTETRICS & GYNECOLOGY

## 2022-02-21 PROCEDURE — 250N000013 HC RX MED GY IP 250 OP 250 PS 637: Performed by: OBSTETRICS & GYNECOLOGY

## 2022-02-21 PROCEDURE — 120N000001 HC R&B MED SURG/OB

## 2022-02-21 PROCEDURE — 99207 PR CONSULT E&M CHANGED TO INITIAL LEVEL: CPT | Performed by: INTERNAL MEDICINE

## 2022-02-21 PROCEDURE — 84156 ASSAY OF PROTEIN URINE: CPT | Performed by: OBSTETRICS & GYNECOLOGY

## 2022-02-21 PROCEDURE — 250N000009 HC RX 250: Performed by: NURSE ANESTHETIST, CERTIFIED REGISTERED

## 2022-02-21 PROCEDURE — 84460 ALANINE AMINO (ALT) (SGPT): CPT | Performed by: OBSTETRICS & GYNECOLOGY

## 2022-02-21 PROCEDURE — 250N000011 HC RX IP 250 OP 636: Performed by: OBSTETRICS & GYNECOLOGY

## 2022-02-21 PROCEDURE — 85018 HEMOGLOBIN: CPT | Performed by: OBSTETRICS & GYNECOLOGY

## 2022-02-21 PROCEDURE — 250N000011 HC RX IP 250 OP 636: Performed by: NURSE ANESTHETIST, CERTIFIED REGISTERED

## 2022-02-21 PROCEDURE — 84450 TRANSFERASE (AST) (SGOT): CPT | Performed by: OBSTETRICS & GYNECOLOGY

## 2022-02-21 PROCEDURE — 360N000076 HC SURGERY LEVEL 3, PER MIN: Performed by: OBSTETRICS & GYNECOLOGY

## 2022-02-21 RX ORDER — ACETAMINOPHEN 325 MG/1
975 TABLET ORAL ONCE
Status: COMPLETED | OUTPATIENT
Start: 2022-02-21 | End: 2022-02-21

## 2022-02-21 RX ORDER — SIMETHICONE 80 MG
80 TABLET,CHEWABLE ORAL 4 TIMES DAILY PRN
Status: DISCONTINUED | OUTPATIENT
Start: 2022-02-21 | End: 2022-02-24 | Stop reason: HOSPADM

## 2022-02-21 RX ORDER — DIPHENHYDRAMINE HYDROCHLORIDE 50 MG/ML
25 INJECTION INTRAMUSCULAR; INTRAVENOUS EVERY 6 HOURS PRN
Status: CANCELLED | OUTPATIENT
Start: 2022-02-21

## 2022-02-21 RX ORDER — ONDANSETRON 4 MG/1
4 TABLET, ORALLY DISINTEGRATING ORAL EVERY 6 HOURS PRN
Status: DISCONTINUED | OUTPATIENT
Start: 2022-02-21 | End: 2022-02-24 | Stop reason: HOSPADM

## 2022-02-21 RX ORDER — TRANEXAMIC ACID 10 MG/ML
1 INJECTION, SOLUTION INTRAVENOUS EVERY 30 MIN PRN
Status: DISCONTINUED | OUTPATIENT
Start: 2022-02-21 | End: 2022-02-24 | Stop reason: HOSPADM

## 2022-02-21 RX ORDER — NALOXONE HYDROCHLORIDE 0.4 MG/ML
0.2 INJECTION, SOLUTION INTRAMUSCULAR; INTRAVENOUS; SUBCUTANEOUS
Status: DISCONTINUED | OUTPATIENT
Start: 2022-02-21 | End: 2022-02-21

## 2022-02-21 RX ORDER — CEFAZOLIN SODIUM/WATER 2 G/20 ML
2 SYRINGE (ML) INTRAVENOUS SEE ADMIN INSTRUCTIONS
Status: DISCONTINUED | OUTPATIENT
Start: 2022-02-21 | End: 2022-02-21 | Stop reason: HOSPADM

## 2022-02-21 RX ORDER — PRENATAL VIT/IRON FUM/FOLIC AC 27MG-0.8MG
1 TABLET ORAL DAILY
Status: DISCONTINUED | OUTPATIENT
Start: 2022-02-21 | End: 2022-02-24 | Stop reason: HOSPADM

## 2022-02-21 RX ORDER — MISOPROSTOL 200 UG/1
800 TABLET ORAL
Status: DISCONTINUED | OUTPATIENT
Start: 2022-02-21 | End: 2022-02-21 | Stop reason: HOSPADM

## 2022-02-21 RX ORDER — ONDANSETRON 4 MG/1
4 TABLET, ORALLY DISINTEGRATING ORAL EVERY 30 MIN PRN
Status: CANCELLED | OUTPATIENT
Start: 2022-02-21

## 2022-02-21 RX ORDER — FENTANYL CITRATE-0.9 % NACL/PF 10 MCG/ML
PLASTIC BAG, INJECTION (ML) INTRAVENOUS CONTINUOUS PRN
Status: DISCONTINUED | OUTPATIENT
Start: 2022-02-21 | End: 2022-02-21

## 2022-02-21 RX ORDER — ONDANSETRON 2 MG/ML
INJECTION INTRAMUSCULAR; INTRAVENOUS PRN
Status: DISCONTINUED | OUTPATIENT
Start: 2022-02-21 | End: 2022-02-21

## 2022-02-21 RX ORDER — MISOPROSTOL 200 UG/1
400 TABLET ORAL
Status: DISCONTINUED | OUTPATIENT
Start: 2022-02-21 | End: 2022-02-24 | Stop reason: HOSPADM

## 2022-02-21 RX ORDER — DEXTROSE, SODIUM CHLORIDE, SODIUM LACTATE, POTASSIUM CHLORIDE, AND CALCIUM CHLORIDE 5; .6; .31; .03; .02 G/100ML; G/100ML; G/100ML; G/100ML; G/100ML
INJECTION, SOLUTION INTRAVENOUS CONTINUOUS
Status: DISCONTINUED | OUTPATIENT
Start: 2022-02-21 | End: 2022-02-22

## 2022-02-21 RX ORDER — KETOROLAC TROMETHAMINE 30 MG/ML
30 INJECTION, SOLUTION INTRAMUSCULAR; INTRAVENOUS EVERY 6 HOURS
Status: COMPLETED | OUTPATIENT
Start: 2022-02-21 | End: 2022-02-22

## 2022-02-21 RX ORDER — NALBUPHINE HYDROCHLORIDE 10 MG/ML
2.5-5 INJECTION, SOLUTION INTRAMUSCULAR; INTRAVENOUS; SUBCUTANEOUS EVERY 6 HOURS PRN
Status: DISCONTINUED | OUTPATIENT
Start: 2022-02-21 | End: 2022-02-21

## 2022-02-21 RX ORDER — CARBOPROST TROMETHAMINE 250 UG/ML
250 INJECTION, SOLUTION INTRAMUSCULAR
Status: DISCONTINUED | OUTPATIENT
Start: 2022-02-21 | End: 2022-02-21 | Stop reason: HOSPADM

## 2022-02-21 RX ORDER — AMOXICILLIN 250 MG
1 CAPSULE ORAL 2 TIMES DAILY
Status: DISCONTINUED | OUTPATIENT
Start: 2022-02-21 | End: 2022-02-24 | Stop reason: HOSPADM

## 2022-02-21 RX ORDER — ONDANSETRON 2 MG/ML
4 INJECTION INTRAMUSCULAR; INTRAVENOUS EVERY 30 MIN PRN
Status: CANCELLED | OUTPATIENT
Start: 2022-02-21

## 2022-02-21 RX ORDER — NALOXONE HYDROCHLORIDE 0.4 MG/ML
0.4 INJECTION, SOLUTION INTRAMUSCULAR; INTRAVENOUS; SUBCUTANEOUS
Status: DISCONTINUED | OUTPATIENT
Start: 2022-02-21 | End: 2022-02-21

## 2022-02-21 RX ORDER — AMOXICILLIN 250 MG
2 CAPSULE ORAL 2 TIMES DAILY
Status: DISCONTINUED | OUTPATIENT
Start: 2022-02-21 | End: 2022-02-24 | Stop reason: HOSPADM

## 2022-02-21 RX ORDER — METHYLERGONOVINE MALEATE 0.2 MG/ML
200 INJECTION INTRAVENOUS
Status: DISCONTINUED | OUTPATIENT
Start: 2022-02-21 | End: 2022-02-24 | Stop reason: HOSPADM

## 2022-02-21 RX ORDER — MISOPROSTOL 200 UG/1
400 TABLET ORAL
Status: DISCONTINUED | OUTPATIENT
Start: 2022-02-21 | End: 2022-02-21 | Stop reason: HOSPADM

## 2022-02-21 RX ORDER — DEXTROSE MONOHYDRATE 25 G/50ML
25-50 INJECTION, SOLUTION INTRAVENOUS
Status: DISCONTINUED | OUTPATIENT
Start: 2022-02-21 | End: 2022-02-24 | Stop reason: HOSPADM

## 2022-02-21 RX ORDER — BUPIVACAINE HYDROCHLORIDE 7.5 MG/ML
INJECTION, SOLUTION INTRASPINAL PRN
Status: DISCONTINUED | OUTPATIENT
Start: 2022-02-21 | End: 2022-02-21

## 2022-02-21 RX ORDER — FUROSEMIDE 10 MG/ML
20 INJECTION INTRAMUSCULAR; INTRAVENOUS ONCE
Status: COMPLETED | OUTPATIENT
Start: 2022-02-21 | End: 2022-02-21

## 2022-02-21 RX ORDER — MISOPROSTOL 200 UG/1
800 TABLET ORAL
Status: DISCONTINUED | OUTPATIENT
Start: 2022-02-21 | End: 2022-02-24 | Stop reason: HOSPADM

## 2022-02-21 RX ORDER — IBUPROFEN 800 MG/1
800 TABLET, FILM COATED ORAL EVERY 6 HOURS
Status: DISCONTINUED | OUTPATIENT
Start: 2022-02-22 | End: 2022-02-24 | Stop reason: HOSPADM

## 2022-02-21 RX ORDER — OXYTOCIN/0.9 % SODIUM CHLORIDE 30/500 ML
340 PLASTIC BAG, INJECTION (ML) INTRAVENOUS CONTINUOUS PRN
Status: DISCONTINUED | OUTPATIENT
Start: 2022-02-21 | End: 2022-02-21 | Stop reason: HOSPADM

## 2022-02-21 RX ORDER — METHYLERGONOVINE MALEATE 0.2 MG/ML
200 INJECTION INTRAVENOUS
Status: DISCONTINUED | OUTPATIENT
Start: 2022-02-21 | End: 2022-02-21 | Stop reason: HOSPADM

## 2022-02-21 RX ORDER — CEFAZOLIN SODIUM/WATER 2 G/20 ML
2 SYRINGE (ML) INTRAVENOUS
Status: DISCONTINUED | OUTPATIENT
Start: 2022-02-21 | End: 2022-02-21 | Stop reason: HOSPADM

## 2022-02-21 RX ORDER — PROCHLORPERAZINE 25 MG
25 SUPPOSITORY, RECTAL RECTAL EVERY 12 HOURS PRN
Status: DISCONTINUED | OUTPATIENT
Start: 2022-02-21 | End: 2022-02-24 | Stop reason: HOSPADM

## 2022-02-21 RX ORDER — MODIFIED LANOLIN
OINTMENT (GRAM) TOPICAL
Status: DISCONTINUED | OUTPATIENT
Start: 2022-02-21 | End: 2022-02-24 | Stop reason: HOSPADM

## 2022-02-21 RX ORDER — LIDOCAINE 40 MG/G
CREAM TOPICAL
Status: DISCONTINUED | OUTPATIENT
Start: 2022-02-21 | End: 2022-02-21 | Stop reason: HOSPADM

## 2022-02-21 RX ORDER — EPHEDRINE SULFATE 50 MG/ML
5 INJECTION, SOLUTION INTRAMUSCULAR; INTRAVENOUS; SUBCUTANEOUS
Status: DISCONTINUED | OUTPATIENT
Start: 2022-02-21 | End: 2022-02-21 | Stop reason: HOSPADM

## 2022-02-21 RX ORDER — OXYTOCIN 10 [USP'U]/ML
10 INJECTION, SOLUTION INTRAMUSCULAR; INTRAVENOUS
Status: DISCONTINUED | OUTPATIENT
Start: 2022-02-21 | End: 2022-02-24 | Stop reason: HOSPADM

## 2022-02-21 RX ORDER — METOCLOPRAMIDE 10 MG/1
10 TABLET ORAL EVERY 6 HOURS PRN
Status: DISCONTINUED | OUTPATIENT
Start: 2022-02-21 | End: 2022-02-24 | Stop reason: HOSPADM

## 2022-02-21 RX ORDER — OXYCODONE HYDROCHLORIDE 5 MG/1
5 TABLET ORAL EVERY 4 HOURS PRN
Status: DISCONTINUED | OUTPATIENT
Start: 2022-02-21 | End: 2022-02-24 | Stop reason: HOSPADM

## 2022-02-21 RX ORDER — OXYTOCIN/0.9 % SODIUM CHLORIDE 30/500 ML
340 PLASTIC BAG, INJECTION (ML) INTRAVENOUS CONTINUOUS PRN
Status: DISCONTINUED | OUTPATIENT
Start: 2022-02-21 | End: 2022-02-24 | Stop reason: HOSPADM

## 2022-02-21 RX ORDER — GLYCOPYRROLATE 0.2 MG/ML
INJECTION, SOLUTION INTRAMUSCULAR; INTRAVENOUS PRN
Status: DISCONTINUED | OUTPATIENT
Start: 2022-02-21 | End: 2022-02-21

## 2022-02-21 RX ORDER — NICOTINE POLACRILEX 4 MG
15-30 LOZENGE BUCCAL
Status: DISCONTINUED | OUTPATIENT
Start: 2022-02-21 | End: 2022-02-24 | Stop reason: HOSPADM

## 2022-02-21 RX ORDER — BISACODYL 10 MG
10 SUPPOSITORY, RECTAL RECTAL DAILY PRN
Status: DISCONTINUED | OUTPATIENT
Start: 2022-02-23 | End: 2022-02-24 | Stop reason: HOSPADM

## 2022-02-21 RX ORDER — OXYTOCIN 10 [USP'U]/ML
10 INJECTION, SOLUTION INTRAMUSCULAR; INTRAVENOUS
Status: DISCONTINUED | OUTPATIENT
Start: 2022-02-21 | End: 2022-02-21

## 2022-02-21 RX ORDER — NALOXONE HYDROCHLORIDE 0.4 MG/ML
0.2 INJECTION, SOLUTION INTRAMUSCULAR; INTRAVENOUS; SUBCUTANEOUS
Status: DISCONTINUED | OUTPATIENT
Start: 2022-02-21 | End: 2022-02-24 | Stop reason: HOSPADM

## 2022-02-21 RX ORDER — ACETAMINOPHEN 325 MG/1
975 TABLET ORAL EVERY 6 HOURS
Status: DISCONTINUED | OUTPATIENT
Start: 2022-02-21 | End: 2022-02-24 | Stop reason: HOSPADM

## 2022-02-21 RX ORDER — SODIUM CHLORIDE, SODIUM LACTATE, POTASSIUM CHLORIDE, CALCIUM CHLORIDE 600; 310; 30; 20 MG/100ML; MG/100ML; MG/100ML; MG/100ML
INJECTION, SOLUTION INTRAVENOUS CONTINUOUS
Status: CANCELLED | OUTPATIENT
Start: 2022-02-21

## 2022-02-21 RX ORDER — HYDROCORTISONE 2.5 %
CREAM (GRAM) TOPICAL 3 TIMES DAILY PRN
Status: DISCONTINUED | OUTPATIENT
Start: 2022-02-21 | End: 2022-02-24 | Stop reason: HOSPADM

## 2022-02-21 RX ORDER — OXYTOCIN/0.9 % SODIUM CHLORIDE 30/500 ML
PLASTIC BAG, INJECTION (ML) INTRAVENOUS PRN
Status: DISCONTINUED | OUTPATIENT
Start: 2022-02-21 | End: 2022-02-21

## 2022-02-21 RX ORDER — LIDOCAINE 40 MG/G
CREAM TOPICAL
Status: DISCONTINUED | OUTPATIENT
Start: 2022-02-21 | End: 2022-02-24 | Stop reason: HOSPADM

## 2022-02-21 RX ORDER — DIPHENHYDRAMINE HCL 25 MG
25 CAPSULE ORAL EVERY 6 HOURS PRN
Status: CANCELLED | OUTPATIENT
Start: 2022-02-21

## 2022-02-21 RX ORDER — METOCLOPRAMIDE HYDROCHLORIDE 5 MG/ML
10 INJECTION INTRAMUSCULAR; INTRAVENOUS EVERY 6 HOURS PRN
Status: DISCONTINUED | OUTPATIENT
Start: 2022-02-21 | End: 2022-02-24 | Stop reason: HOSPADM

## 2022-02-21 RX ORDER — MORPHINE SULFATE 1 MG/ML
INJECTION, SOLUTION EPIDURAL; INTRATHECAL; INTRAVENOUS PRN
Status: DISCONTINUED | OUTPATIENT
Start: 2022-02-21 | End: 2022-02-21

## 2022-02-21 RX ORDER — OXYTOCIN 10 [USP'U]/ML
10 INJECTION, SOLUTION INTRAMUSCULAR; INTRAVENOUS
Status: DISCONTINUED | OUTPATIENT
Start: 2022-02-21 | End: 2022-02-21 | Stop reason: HOSPADM

## 2022-02-21 RX ORDER — NALOXONE HYDROCHLORIDE 0.4 MG/ML
0.4 INJECTION, SOLUTION INTRAMUSCULAR; INTRAVENOUS; SUBCUTANEOUS
Status: DISCONTINUED | OUTPATIENT
Start: 2022-02-21 | End: 2022-02-24 | Stop reason: HOSPADM

## 2022-02-21 RX ORDER — CARBOPROST TROMETHAMINE 250 UG/ML
250 INJECTION, SOLUTION INTRAMUSCULAR
Status: DISCONTINUED | OUTPATIENT
Start: 2022-02-21 | End: 2022-02-24 | Stop reason: HOSPADM

## 2022-02-21 RX ORDER — CITRIC ACID/SODIUM CITRATE 334-500MG
30 SOLUTION, ORAL ORAL
Status: COMPLETED | OUTPATIENT
Start: 2022-02-21 | End: 2022-02-21

## 2022-02-21 RX ORDER — SODIUM CHLORIDE, SODIUM LACTATE, POTASSIUM CHLORIDE, CALCIUM CHLORIDE 600; 310; 30; 20 MG/100ML; MG/100ML; MG/100ML; MG/100ML
INJECTION, SOLUTION INTRAVENOUS CONTINUOUS
Status: DISCONTINUED | OUTPATIENT
Start: 2022-02-21 | End: 2022-02-21 | Stop reason: HOSPADM

## 2022-02-21 RX ORDER — OXYTOCIN/0.9 % SODIUM CHLORIDE 30/500 ML
100-340 PLASTIC BAG, INJECTION (ML) INTRAVENOUS CONTINUOUS PRN
Status: DISCONTINUED | OUTPATIENT
Start: 2022-02-21 | End: 2022-02-21

## 2022-02-21 RX ORDER — TRANEXAMIC ACID 10 MG/ML
1 INJECTION, SOLUTION INTRAVENOUS EVERY 30 MIN PRN
Status: DISCONTINUED | OUTPATIENT
Start: 2022-02-21 | End: 2022-02-21 | Stop reason: HOSPADM

## 2022-02-21 RX ORDER — PROCHLORPERAZINE MALEATE 10 MG
10 TABLET ORAL EVERY 6 HOURS PRN
Status: DISCONTINUED | OUTPATIENT
Start: 2022-02-21 | End: 2022-02-24 | Stop reason: HOSPADM

## 2022-02-21 RX ORDER — ONDANSETRON 2 MG/ML
4 INJECTION INTRAMUSCULAR; INTRAVENOUS EVERY 6 HOURS PRN
Status: DISCONTINUED | OUTPATIENT
Start: 2022-02-21 | End: 2022-02-24 | Stop reason: HOSPADM

## 2022-02-21 RX ADMIN — SODIUM CITRATE AND CITRIC ACID MONOHYDRATE 30 ML: 500; 334 SOLUTION ORAL at 11:20

## 2022-02-21 RX ADMIN — OXYTOCIN-SODIUM CHLORIDE 0.9% IV SOLN 30 UNIT/500ML 449 ML: 30-0.9/5 SOLUTION at 12:51

## 2022-02-21 RX ADMIN — SODIUM CHLORIDE, POTASSIUM CHLORIDE, SODIUM LACTATE AND CALCIUM CHLORIDE 500 ML: 600; 310; 30; 20 INJECTION, SOLUTION INTRAVENOUS at 21:03

## 2022-02-21 RX ADMIN — GLYCOPYRROLATE 0.2 MG: 0.2 INJECTION, SOLUTION INTRAMUSCULAR; INTRAVENOUS at 11:53

## 2022-02-21 RX ADMIN — FUROSEMIDE 20 MG: 10 INJECTION, SOLUTION INTRAVENOUS at 21:42

## 2022-02-21 RX ADMIN — SODIUM CHLORIDE, SODIUM LACTATE, POTASSIUM CHLORIDE, CALCIUM CHLORIDE AND DEXTROSE MONOHYDRATE: 5; 600; 310; 30; 20 INJECTION, SOLUTION INTRAVENOUS at 15:05

## 2022-02-21 RX ADMIN — Medication 50 MCG/MIN: at 11:37

## 2022-02-21 RX ADMIN — ACETAMINOPHEN 975 MG: 325 TABLET, FILM COATED ORAL at 11:19

## 2022-02-21 RX ADMIN — SODIUM CHLORIDE, POTASSIUM CHLORIDE, SODIUM LACTATE AND CALCIUM CHLORIDE: 600; 310; 30; 20 INJECTION, SOLUTION INTRAVENOUS at 11:07

## 2022-02-21 RX ADMIN — ONDANSETRON HYDROCHLORIDE 4 MG: 2 INJECTION, SOLUTION INTRAVENOUS at 12:11

## 2022-02-21 RX ADMIN — BUPIVACAINE HYDROCHLORIDE IN DEXTROSE 1.7 ML: 7.5 INJECTION, SOLUTION SUBARACHNOID at 11:40

## 2022-02-21 RX ADMIN — SODIUM CHLORIDE, SODIUM LACTATE, POTASSIUM CHLORIDE, CALCIUM CHLORIDE AND DEXTROSE MONOHYDRATE: 5; 600; 310; 30; 20 INJECTION, SOLUTION INTRAVENOUS at 22:31

## 2022-02-21 RX ADMIN — ACETAMINOPHEN 975 MG: 325 TABLET, FILM COATED ORAL at 20:57

## 2022-02-21 RX ADMIN — MORPHINE SULFATE 0.2 MG: 1 INJECTION EPIDURAL; INTRATHECAL; INTRAVENOUS at 11:40

## 2022-02-21 RX ADMIN — ONDANSETRON 4 MG: 2 INJECTION INTRAMUSCULAR; INTRAVENOUS at 18:30

## 2022-02-21 RX ADMIN — METOCLOPRAMIDE HYDROCHLORIDE 10 MG: 5 INJECTION INTRAMUSCULAR; INTRAVENOUS at 15:51

## 2022-02-21 RX ADMIN — Medication 2 G: at 11:33

## 2022-02-21 RX ADMIN — KETOROLAC TROMETHAMINE 30 MG: 30 INJECTION, SOLUTION INTRAMUSCULAR; INTRAVENOUS at 21:09

## 2022-02-21 RX ADMIN — OXYTOCIN-SODIUM CHLORIDE 0.9% IV SOLN 30 UNIT/500ML 1 ML: 30-0.9/5 SOLUTION at 12:09

## 2022-02-21 ASSESSMENT — ACTIVITIES OF DAILY LIVING (ADL)
DIFFICULTY_EATING/SWALLOWING: NO
FALL_HISTORY_WITHIN_LAST_SIX_MONTHS: NO
ADLS_ACUITY_SCORE: 3
ADLS_ACUITY_SCORE: 3
TOILETING_ISSUES: NO
ADLS_ACUITY_SCORE: 3
DIFFICULTY_COMMUNICATING: NO
CONCENTRATING,_REMEMBERING_OR_MAKING_DECISIONS_DIFFICULTY: NO
ADLS_ACUITY_SCORE: 4
WEAR_GLASSES_OR_BLIND: NO
ADLS_ACUITY_SCORE: 3
DRESSING/BATHING_DIFFICULTY: NO
CHANGE_IN_FUNCTIONAL_STATUS_SINCE_ONSET_OF_CURRENT_ILLNESS/INJURY: NO
ADLS_ACUITY_SCORE: 5
ADLS_ACUITY_SCORE: 3
ADLS_ACUITY_SCORE: 3
HEARING_DIFFICULTY_OR_DEAF: NO
ADLS_ACUITY_SCORE: 3
DOING_ERRANDS_INDEPENDENTLY_DIFFICULTY: NO
WALKING_OR_CLIMBING_STAIRS_DIFFICULTY: NO
ADLS_ACUITY_SCORE: 3

## 2022-02-21 NOTE — PROGRESS NOTES
Patient Name:  Audra Stewart   MRN:  [id number]  Age:  36 year old    YOB: 1985      POSTPARTUM PROGRESS NOTE    Pt is PPD#1 s/p planned .  She is doing well without complaints.  Pt is ambulating, tolerating a regular diet.  Pain is well controlled and lochia is within normal limits.  She is breastfeeding.  Baby is doing well. Long in place o/n due to multiple straight cath and low UOP which has normalized.    Denies stx's of pre-e.    Reports swelling in her legs has decreased since admission.    After discussion, patient agrees with plan for IV iron infusion today.    Objective:    Temp:  [97.5  F (36.4  C)-98.5  F (36.9  C)] 97.5  F (36.4  C)  Pulse:  [54-58] 58  Resp:  [14-16] 14  BP: (137-170)/(61-89) 137/61  SpO2:  [94 %-100 %] 99 %  214 lbs 0 oz    General Appearance:  NAD  Abdomen:  nontender, nondistended  Fundus:  firm, below the umbilicus; Incision: C/D/I      Lower extremities:  +1 edema; no hyperreflexia    Lab Review:    ABO/RH(D)   Date Value Ref Range Status   2022 O POS  Final     Hemoglobin   Date Value Ref Range Status   2022 9.1 (L) 11.7 - 15.7 g/dL Final   2022 9.1 (L) 11.7 - 15.7 g/dL Final   2019 9.5 (L) 11.7 - 15.7 g/dL Final   2019 11.1 (L) 11.7 - 15.7 g/dL Final       No results found for: WBC  No results found for: RBC  Lab Results   Component Value Date    HGB 9.1 2022    HGB 9.5 2019     No results found for: HCT  No components found for: MCT  No results found for: MCV  No results found for: MCH  No results found for: MCHC  No results found for: RDW  No results found for: PLT    Assessment:  POD#1 s/p planned repeat , doing well.    Plan:   - Postpartum: recovering well. Pain well controlled. Cont PO pain meds and regular diet. Encourage ambulation.    Pre-e  - w/o severe features, no Mg  - With presentation for delivery  - HELLP labs    + Pr/Cr 2.08   + AM CMP: wnl   + AM platelets: 140; mild  thrombocytopenia, not severe feature  - Many mild range BP's    + Procardia XR 30mg start today    - Note low resting HR    - Note Hx bicuspid aortic valve     + Last echo 1/27/22 thru PN w/o signs of aortic stenosis and mild AR, no evidence of aortic dilation, EF 65%.  - Continue q4hr BP checks    Low UOP POD0  - Last 12hrs (8pm-8AM) 625mL UOP; >0.5mL/Kg/Hr  - Multiple straight cath POD0   + s/p x1 lasix dose POD0   + Long placed o/n at 1am; remove in 24hrs  - IVF 125mL/Hr continue, re-eval PM  - Encourage PO intake and ambulation    Anemia of pregnancy with ABLA  - Hgb 9.1 -> 7.5 POD1  - EBL 700mL  - IV iron infusion POD1    T1DM  - Hospitalist consulted, appreciate assistance   + Continue with pump (see 2/11/22 PN endocrine note C.eveverywhere for pump settings immediate pp).   + BS at bedtime and prior to meals  - F/u PN endocrine 6-8 wks pp  - S/p Nutrition consult - no interventions required    - Contraception: considering mIUD  - Dispo: anticipate DC POD#3, depending on BP

## 2022-02-21 NOTE — CONSULTS
Consult Date: 2022    Hospitalist service is consulted for diabetic management in this type 1 diabetic.    HISTORY OF PRESENT ILLNESS:  Ms. Stewart is a 36-year-old female who was admitted for a .  The patient was approximately 39 weeks pregnant.  She had previous  in the past.  This was a repeat .  She reports that her pregnancy went well with no complications.  However, towards the end of her pregnancy, she was noted to have some higher blood pressures and protein on urinalysis concerning for possible preeclampsia.    In regard to diabetic management, patient is managed with an insulin pump.  She follows with an endocrinologist.  She reports her blood sugars were well controlled during pregnancy with a hemoglobin A1c near 6 during her pregnancy.  Her endocrinologist did provide her with recommendations for pump management post-delivery, and the patient implemented these after delivery earlier today.  Blood sugars today have been in the  range, well controlled.  The patient would prefer to manage her diabetes while in the hospital with her pump.  She has a long history of diabetes mellitus and reports using pumps since 18 years of age.  She has been using her current pump for the past 1 year, and it has been functioning well for her.    The patient is currently resting comfortably after a  earlier today.  Significant other and baby are in room when I am seeing the patient today, along with the nursing staff.     Anesthesia for  was spinal anesthesia.    PAST MEDICAL HISTORY:     1.  Status post .  2.  Type 1 diabetes mellitus, managed with insulin pump for many years.  Follows with endocrinologist, and reports blood sugars are well controlled with hemoglobin A1c near 6 during pregnancy.  3.  History of celiac disease.  4.  History of retinopathy related to diabetes.    CURRENT OUTPATIENT MEDICATIONS:    1.  Insulin pump.  2.  Aspirin 81 mg daily.  3.   Prenatal vitamin.    ALLERGIES:  GLUTEN.    FAMILY HISTORY:  Reviewed.  Nothing contributory to this admission.    SOCIAL HISTORY:  The patient denies smoking.  No alcohol use during pregnancy.    REVIEW OF SYSTEMS:  See HPI for details.  Comprehensive greater than 10-point review of systems is otherwise negative besides that detailed above.    PHYSICAL EXAMINATION:    VITAL SIGNS:  Blood pressure is currently 159/78 with a heart rate of 57.  No fever.  Saturation 100% on room air.  GENERAL:  The patient appears nontoxic and in no acute distress.  She appears awake, alert, oriented x3.  She is a good historian.  Significant other and baby are at bedside.  Nursing in room when I am seeing the patient.  HEENT:  Head is atraumatic.  Sclerae white.  Eyelids normal.  Conjunctivae normal.  Extraocular movements are intact.  NECK:  Supple.  No cervical or supraclavicular lymphadenopathy.  CARDIOVASCULAR:  Regular rate and rhythm.  Trace systolic ejection murmur.  No significant edema.  LUNGS:  Clear to auscultation bilaterally.  No intercostal retractions.  No conversational dyspnea.  ABDOMEN:  Normal bowel sounds.  I did not palpate her abdomen, given  earlier today.  SKIN:  Exam shows no rashes.  No jaundice.  Skin is dry to touch.  NEUROLOGIC:  Cranial nerves II through XII are intact.  Moves all extremities appropriately.  Sensation intact to light touch in the upper and lower extremities bilaterally.  PSYCHIATRIC:  The patient is awake, alert and oriented x3.  Mood and affect are normal and appropriate.    LABORATORY AND IMAGING DATA:  Reviewed above in HPI.    IMPRESSION AND PLAN:  Ms. Stewart is a 36-year-old female who presented for elective  at approximately 39 weeks' gestation.  She reports an unremarkable pregnancy.  Today, she is noted to have elevated blood pressure and protein on urinalysis concerning for possible preeclampsia.  Hospitalist service was consulted to assist with diabetes  management.  The patient is a type 1 diabetic and manages insulin with insulin pump.  She has had no issues with diabetic management during pregnancy with good control of blood sugars.  1.  Diabetes mellitus type 1.  Long history of management on insulin pump.  The patient is very well-versed in diabetic management and pump use.  Will allow patient to manage her diabetes while hospitalized with the use of her insulin pump.  She follows with an endocrinologist, who provided settings for her to use after delivery.  These have been implemented with excellent blood sugar control so far today post-delivery.  The patient is very comfortable with this plan and wishes to manage her diabetes mellitus with her pump while hospitalized.  2.  Possible preeclampsia:  Management per OB/GYN service.  Noted to have elevated blood pressures today and protein in urine.  3.  History of retinopathy related to diabetes mellitus.  4.  History of celiac sprue.    PLAN:    1.  Patient to manage diabetes mellitus with the use of her insulin pump.  2.  Have asked nursing staff to record blood sugar measurements before meals and at bedtime, and also to report any blood sugars that are less than 60 or higher than 200 throughout the day.  3.  The patient will let us know if she needs any further assistance with her blood sugar management beyond her pump use.  4.  Management of possible preeclampsia per OB/GYN service.    Jensen Herrera MD        D: 2022   T: 2022   MT: CINDY    Name:     LILLIANA STAPLETON  MRN:      [id number]        Account:      [id number]   :      1985           Consult Date: 2022     Document: [id number]

## 2022-02-21 NOTE — PLAN OF CARE
Data: Audra Stewart transferred to [id number] via cart at 1510. Baby transferred via parent's arms.  Action: Receiving unit notified of transfer: Yes. Patient and family notified of room change. Report given to Cristy MCWILLIAMS at 1515. Belongings sent to receiving unit. Accompanied by Registered Nurse. Oriented patient to surroundings. Call light within reach. ID bands double-checked with receiving RN.  Response: Patient tolerated transfer and is stable.

## 2022-02-21 NOTE — ANESTHESIA PREPROCEDURE EVALUATION
Anesthesia Pre-Procedure Evaluation    Patient: Audra Stewart   MRN: [id number] : 1985        Preoperative Diagnosis: Previous  section [Z98.891]  Type 1 diabetes mellitus (H) [E10.9]    Procedure : Procedure(s):   SECTION repeat          Past Medical History:   Diagnosis Date     Gastroesophageal reflux disease      Pancreatic disease      Type 1 DM       Past Surgical History:   Procedure Laterality Date      SECTION N/A 2019    Procedure:  section;  Surgeon: Nurys Linares MD;  Location: [facility] L+D     LST  lection  2019    boy 8#  FTP 3-4 cm     None        Allergies   Allergen Reactions     Gluten Meal      Unstable glucoses with gluten      Social History     Tobacco Use     Smoking status: Never Smoker     Smokeless tobacco: Never Used   Substance Use Topics     Alcohol use: Not Currently      Wt Readings from Last 1 Encounters:   19 95.3 kg (210 lb)        Anesthesia Evaluation            ROS/MED HX  ENT/Pulmonary:  - neg pulmonary ROS     Neurologic:  - neg neurologic ROS     Cardiovascular:  - neg cardiovascular ROS     METS/Exercise Tolerance:     Hematologic:  - neg hematologic  ROS     Musculoskeletal:  - neg musculoskeletal ROS     GI/Hepatic:     (+) GERD,     Renal/Genitourinary:       Endo:     (+) type I DM, Obesity,     Psychiatric/Substance Use:  - neg psychiatric ROS     Infectious Disease:  - neg infectious disease ROS     Malignancy:       Other:            Physical Exam    Airway        Mallampati: II   TM distance: > 3 FB   Neck ROM: full   Mouth opening: > 3 cm    Respiratory Devices and Support         Dental           Cardiovascular   cardiovascular exam normal          Pulmonary   pulmonary exam normal            Other findings: Lab Test        22                       0923          0614          2247          HGB          9.1*         9.5*         11.1*          Lab Test         02/20/22                       0923          CR           0.66            OUTSIDE LABS:  CBC:   Lab Results   Component Value Date    HGB 9.1 (L) 02/20/2022    HGB 9.5 (L) 05/19/2019     BMP:   Lab Results   Component Value Date    CR 0.66 02/20/2022     COAGS: No results found for: PTT, INR, FIBR  POC:   Lab Results   Component Value Date     (H) 05/21/2019     HEPATIC: No results found for: ALBUMIN, PROTTOTAL, ALT, AST, GGT, ALKPHOS, BILITOTAL, BILIDIRECT, GLENN  OTHER:   Lab Results   Component Value Date    A1C 6.3 (H) 05/18/2019       Anesthesia Plan    ASA Status:  2      Anesthesia Type: Spinal.              Consents    Anesthesia Plan(s) and associated risks, benefits, and realistic alternatives discussed. Questions answered and patient/representative(s) expressed understanding.    - Discussed:     - Discussed with:  Patient      - Extended Intubation/Ventilatory Support Discussed: No.    Use of blood products discussed: No .     Postoperative Care    Pain management: IV analgesics, Oral pain medications, intrathecal morphine.   PONV prophylaxis: Ondansetron (or other 5HT-3)     Comments:                Mike Drew MD

## 2022-02-21 NOTE — PROVIDER NOTIFICATION
02/21/22 1050   Provider Notification   Provider Name/Title Dr. Mortensen   Method of Notification Phone   Request Evaluate - Remote   Notification Reason Maternal Vital Sign Change     MD notified of elevated Bps on admission - first 170/89 followed by 150/76 15 mins later. TORB per MD send pre-e labs (AST, ALT, PCR).

## 2022-02-21 NOTE — CONSULTS
See dictated consult.      Hospitalist service consulted for diabetes mellitus.      Pt has a long hx of DM managed via insulin pump.  Pt prefers to manage her DM using her pump while hospitalized.  Her endocrinologist provided recommendations and settings for her pump after delivery and these have been implemented with excellent blood sugar control thus far.      I have asked nursing staff to record blood sugar results prior to meals and at bedtime in chart.  Also request to record any blood sugars less than 60 or higher than 200.      Patient is currently hypertensive and UA shows protein indicating possible preeclampsia.  Management of preeclampsia per OB service.

## 2022-02-21 NOTE — L&D DELIVERY NOTE
"Please see OP note for further details.     Dr. Deepak Mortensen  [phone]      Delivery Summary    Audra Stewart MRN# [id number]   Age: 36 year old YOB: 1985          Terry Female-Audra [[id number]]    Rupture date/time: 22 1206   Rupture type: Artificial Rupture of Membranes  Fluid color: Clear     Delivery/Placenta Date and Time    Delivery Date: 22 Delivery Time: 12:07 PM   Delivering clinician: Sejal Mortensen MD   Other personnel present at delivery:  Provider Role   Bardai, Rozina R, RN           Measurements    Weight: 10 lb 0.1 oz Length: 1' 9\"   Head circumference: 36.2 cm       Delivery (Maternal) (Provider to Complete) ([id number])       Blood Loss  Mother: Audra Stewart #[id number]   Start of Mother's Information    Delivery Blood Loss  22 1152 - 22 0310    Total Surgical QBL Blood Loss (mL) Hospital Encounter 730 mL    Total  730 mL         End of Mother's Information  Mother: Audra Stewart #[id number]          Delivery - Provider to Complete ([id number])    Delivering clinician: Sejal Mortensen MD                   Other personnel:  Provider Role   Bardai, Rozina R, RN                        Sejal Mortensen MD    "

## 2022-02-21 NOTE — ANESTHESIA PROCEDURE NOTES
Intrathecal injection Procedure Note    Pre-Procedure   Staff -        Anesthesiologist:  Mike Drew MD       Performed By: anesthesiologist       Referred By: Began       Location: OR       Pre-Anesthestic Checklist: patient identified, IV checked, risks and benefits discussed, informed consent, monitors and equipment checked, pre-op evaluation, at physician/surgeon's request and post-op pain management  Timeout:       Correct Patient: Yes        Correct Procedure: Yes        Correct Site: Yes        Correct Position: Yes   Procedure Documentation  Procedure: intrathecal injection       Patient Position: sitting       Patient Prep/Sterile Barriers: sterile gloves, mask       Skin prep: Betadine       Needle Gauge: 25.        Needle Length (Inches): 3        Spinal Needle Type: Pencan       Introducer used       Introducer: 20 G      # of attempts: 2 and  # of redirects:     Assessment/Narrative         Paresthesias: No.       LP fluid removal amount (ml): 0.2       CSF fluid: clear.

## 2022-02-21 NOTE — ANESTHESIA POSTPROCEDURE EVALUATION
Patient: Audra Stewart    Procedure: Procedure(s):   SECTION repeat       Anesthesia Type:  Spinal    Note:  Disposition: Inpatient   Postop Pain Control: Uneventful            Sign Out: Well controlled pain   PONV: No   Neuro/Psych: Uneventful            Sign Out: Acceptable/Baseline neuro status   Airway/Respiratory: Uneventful            Sign Out: Acceptable/Baseline resp. status   CV/Hemodynamics: Uneventful            Sign Out: Acceptable CV status; No obvious hypovolemia; No obvious fluid overload   Other NRE: NONE   DID A NON-ROUTINE EVENT OCCUR? No           Last vitals:  Vitals Value Taken Time   BP     Temp     Pulse     Resp     SpO2         Electronically Signed By: Mike Drew MD  2022  1:19 PM

## 2022-02-21 NOTE — PROVIDER NOTIFICATION
Writer requesting clarification on blood sugar monitoring from Dr. Herrera. MD ok with patient monitoring her own sugars from her device and nurse to take note.

## 2022-02-21 NOTE — OP NOTE
Section Procedure Note    Procedure Date: 2022     Pre-operative Diagnosis:    1. IUP at 38w2d GA 2. T1DM  3.Suspected macrosomia  4. Hx of CD  5. Modereate diabetic retinopathy without macular edema   6. Microalbuminuria   7. Maternal bicuspid Ao valve    Post-operative Diagnosis:   1. IUP at 38w2d GA 2. T1DM   3.Large for gestational age  4. Hx of CD  5. Modereate diabetic retinopathy without macular edema   6. Microalbuminuria   7. Maternal bicuspid Ao valve  Procedure: r-LTCS  Surgeon: Dr. Mortensen  Assistant:   Anesthesia: Spinal  MDA: Dr. Drew  Estimated Blood Loss: 730 ml  Total IV Fluids: 800 ml  UOP: 10 ml  Findings:  - Liveborn female infant at 1207 hrs, clear fluid. .   - EFW 4540 g.   - Placenta manually removed intact with 3VC. Normal uterus, bilateral fallopian tubes and ovaries.    Indication for : history of  delivery, suspected macrosomia in the setting of T1DM with recommendations for repeat CD.    Procedure: Final verification of patient and procedure was done. The patient was placed in the supine position with left lateral tilt and was prepped and draped in the usual sterile fashion. 2 g of Cefazolin were given preoperatively. A pfannenstiel skin incision was made with the scalpel and carried to the level of the fascia using the cautery. The rectus fascia was dissected off of the muscle and the peritoneal cavity was entered bluntly. A bladder flap was created and the Sander retractor was placed for protection of the bladder. A low transverse uterine incision was made and extended laterally bluntly.   Entry into the amniotic sac revealed clear fluid. Under controlled conditions, the fetal head was delivered from BARBARA position. The nose and oropharynx were bulb suctioned. There was no nuchal cord. Both shoulders were delivered without complication.  Delayed cord clamping was done.Then the umbilical cord was doubly clamped and cut and the infant was handed to  the awaiting pediatricians. Cord gases  were sent. The placenta was manually removed intact with 3-vessel cord and 30 units of pitocin were added to the existing IV bag. The placenta was not sent to pathology. The uterine cavity was cleared with a sponge. The edges of the uterine incision were grasped with Allis clamps and the uterine incision was closed in two layers, first with a running, locking stitch of 0-vicryl, the second an imbricating non-locking stitch of 0-monocryl. Hemostasis was achieved. The abdomen and the gutters were cleared of old blood and clots. Tubes and ovaries appeared normal bilaterally. The adipose layer was re-approximated with three simple interrupted stitches using 2-0 plain suture. The fascia was closed with a running suture of 0-vicryl. The skin was closed with 4-0 Monocryl and exofin (skin glue).     The patient tolerated the procedure well. Needle, sponge and instrument counts were correct. A sterile dressing was placed over the incision. The patient was taken to the recovery room in stable condition.    Specimens: placenta  Complications: none  Disposition: Recovery  Condition: stable    Dr. VINAY Mortensen  [phone]

## 2022-02-22 LAB
ALBUMIN SERPL-MCNC: 1.6 G/DL (ref 3.4–5)
ALP SERPL-CCNC: 160 U/L (ref 40–150)
ALT SERPL W P-5'-P-CCNC: 19 U/L (ref 0–50)
ANION GAP SERPL CALCULATED.3IONS-SCNC: 4 MMOL/L (ref 3–14)
AST SERPL W P-5'-P-CCNC: 39 U/L (ref 0–45)
BILIRUB SERPL-MCNC: 0.3 MG/DL (ref 0.2–1.3)
BUN SERPL-MCNC: 10 MG/DL (ref 7–30)
CALCIUM SERPL-MCNC: 7.4 MG/DL (ref 8.5–10.1)
CHLORIDE BLD-SCNC: 107 MMOL/L (ref 94–109)
CO2 SERPL-SCNC: 26 MMOL/L (ref 20–32)
CREAT SERPL-MCNC: 0.71 MG/DL (ref 0.52–1.04)
ERYTHROCYTE [DISTWIDTH] IN BLOOD BY AUTOMATED COUNT: 14.4 % (ref 10–15)
GFR SERPL CREATININE-BSD FRML MDRD: >90 ML/MIN/1.73M2
GLUCOSE BLD-MCNC: 73 MG/DL (ref 70–99)
HCT VFR BLD AUTO: 24.8 % (ref 35–47)
HGB BLD-MCNC: 7.5 G/DL (ref 11.7–15.7)
MCH RBC QN AUTO: 25 PG (ref 26.5–33)
MCHC RBC AUTO-ENTMCNC: 30.2 G/DL (ref 31.5–36.5)
MCV RBC AUTO: 83 FL (ref 78–100)
PATH REPORT.COMMENTS IMP SPEC: NORMAL
PATH REPORT.COMMENTS IMP SPEC: NORMAL
PATH REPORT.FINAL DX SPEC: NORMAL
PATH REPORT.GROSS SPEC: NORMAL
PATH REPORT.MICROSCOPIC SPEC OTHER STN: NORMAL
PATH REPORT.RELEVANT HX SPEC: NORMAL
PHOTO IMAGE: NORMAL
PLATELET # BLD AUTO: 140 10E3/UL (ref 150–450)
POTASSIUM BLD-SCNC: 3.9 MMOL/L (ref 3.4–5.3)
PROT SERPL-MCNC: 4.7 G/DL (ref 6.8–8.8)
RBC # BLD AUTO: 3 10E6/UL (ref 3.8–5.2)
SODIUM SERPL-SCNC: 137 MMOL/L (ref 133–144)
WBC # BLD AUTO: 9.9 10E3/UL (ref 4–11)

## 2022-02-22 PROCEDURE — 258N000003 HC RX IP 258 OP 636: Performed by: OBSTETRICS & GYNECOLOGY

## 2022-02-22 PROCEDURE — 999N000079 HC STATISTIC IP LACTATION SERVICES 1-15 MIN

## 2022-02-22 PROCEDURE — 250N000011 HC RX IP 250 OP 636: Performed by: OBSTETRICS & GYNECOLOGY

## 2022-02-22 PROCEDURE — 250N000013 HC RX MED GY IP 250 OP 250 PS 637: Performed by: OBSTETRICS & GYNECOLOGY

## 2022-02-22 PROCEDURE — 80053 COMPREHEN METABOLIC PANEL: CPT | Performed by: OBSTETRICS & GYNECOLOGY

## 2022-02-22 PROCEDURE — 120N000001 HC R&B MED SURG/OB

## 2022-02-22 PROCEDURE — 36415 COLL VENOUS BLD VENIPUNCTURE: CPT | Performed by: OBSTETRICS & GYNECOLOGY

## 2022-02-22 PROCEDURE — 99231 SBSQ HOSP IP/OBS SF/LOW 25: CPT | Performed by: INTERNAL MEDICINE

## 2022-02-22 PROCEDURE — 85014 HEMATOCRIT: CPT | Performed by: OBSTETRICS & GYNECOLOGY

## 2022-02-22 PROCEDURE — 88307 TISSUE EXAM BY PATHOLOGIST: CPT | Mod: 26 | Performed by: PATHOLOGY

## 2022-02-22 RX ORDER — NIFEDIPINE 30 MG/1
30 TABLET, EXTENDED RELEASE ORAL DAILY
Status: DISCONTINUED | OUTPATIENT
Start: 2022-02-22 | End: 2022-02-24 | Stop reason: HOSPADM

## 2022-02-22 RX ORDER — METHYLPREDNISOLONE SODIUM SUCCINATE 125 MG/2ML
125 INJECTION, POWDER, LYOPHILIZED, FOR SOLUTION INTRAMUSCULAR; INTRAVENOUS
Status: DISCONTINUED | OUTPATIENT
Start: 2022-02-22 | End: 2022-02-24 | Stop reason: HOSPADM

## 2022-02-22 RX ORDER — DIPHENHYDRAMINE HYDROCHLORIDE 50 MG/ML
50 INJECTION INTRAMUSCULAR; INTRAVENOUS
Status: DISCONTINUED | OUTPATIENT
Start: 2022-02-22 | End: 2022-02-24 | Stop reason: HOSPADM

## 2022-02-22 RX ADMIN — ACETAMINOPHEN 975 MG: 325 TABLET, FILM COATED ORAL at 04:22

## 2022-02-22 RX ADMIN — ACETAMINOPHEN 975 MG: 325 TABLET, FILM COATED ORAL at 18:08

## 2022-02-22 RX ADMIN — SODIUM CHLORIDE, POTASSIUM CHLORIDE, SODIUM LACTATE AND CALCIUM CHLORIDE 500 ML: 600; 310; 30; 20 INJECTION, SOLUTION INTRAVENOUS at 05:32

## 2022-02-22 RX ADMIN — IBUPROFEN 800 MG: 800 TABLET ORAL at 18:08

## 2022-02-22 RX ADMIN — SENNOSIDES AND DOCUSATE SODIUM 1 TABLET: 50; 8.6 TABLET ORAL at 09:14

## 2022-02-22 RX ADMIN — KETOROLAC TROMETHAMINE 30 MG: 30 INJECTION, SOLUTION INTRAMUSCULAR; INTRAVENOUS at 04:22

## 2022-02-22 RX ADMIN — ACETAMINOPHEN 975 MG: 325 TABLET, FILM COATED ORAL at 10:44

## 2022-02-22 RX ADMIN — SODIUM CHLORIDE, SODIUM LACTATE, POTASSIUM CHLORIDE, CALCIUM CHLORIDE AND DEXTROSE MONOHYDRATE: 5; 600; 310; 30; 20 INJECTION, SOLUTION INTRAVENOUS at 06:52

## 2022-02-22 RX ADMIN — SENNOSIDES AND DOCUSATE SODIUM 1 TABLET: 50; 8.6 TABLET ORAL at 20:27

## 2022-02-22 RX ADMIN — IRON SUCROSE 300 MG: 20 INJECTION, SOLUTION INTRAVENOUS at 13:17

## 2022-02-22 RX ADMIN — KETOROLAC TROMETHAMINE 30 MG: 30 INJECTION, SOLUTION INTRAMUSCULAR; INTRAVENOUS at 10:46

## 2022-02-22 RX ADMIN — NIFEDIPINE 30 MG: 30 TABLET, FILM COATED, EXTENDED RELEASE ORAL at 09:15

## 2022-02-22 RX ADMIN — PRENATAL VITAMINS-IRON FUMARATE 27 MG IRON-FOLIC ACID 0.8 MG TABLET 1 TABLET: at 09:17

## 2022-02-22 ASSESSMENT — ACTIVITIES OF DAILY LIVING (ADL)
ADLS_ACUITY_SCORE: 5
ADLS_ACUITY_SCORE: 3
ADLS_ACUITY_SCORE: 5
ADLS_ACUITY_SCORE: 3
ADLS_ACUITY_SCORE: 5
ADLS_ACUITY_SCORE: 3
ADLS_ACUITY_SCORE: 5
ADLS_ACUITY_SCORE: 3
ADLS_ACUITY_SCORE: 5
ADLS_ACUITY_SCORE: 3

## 2022-02-22 NOTE — PLAN OF CARE
Pt s/p C/S; POD1.  VS and assessment WNL.  Pt has taylor in place until 1AM; output WNL.  Pt [id number], monitoring and reporting Bg/insulin through her own pump; stable. Pain well controlled.  Tolerating activity.  Breastfeeding infant independently.  Positive bonding observed with infant.  Spouse at bedside and supportive of couplet.  Pt stable; continue with current plan of care.

## 2022-02-22 NOTE — PLAN OF CARE
Transferred to postpartum room at 1510, upon transfer pt started vomiting.  Vomiting and nausea continued and antiemetics given per orders.  Pt able to tolerate po clear liquids later this evening after zofran given.  IV fluids infusing per orders.  BPs ranged from 130s-150s/60s-70s.  Pt denies headaches, visual disturbances, and RUQ pain.  Pt ambulated to bathroom however was unable to void.  Bladder scanned for 37 ml at 2000, MD aware (see provider notification note).  IVF bolus and IV lasix given per orders.  Fundus firm, scant-light lochia.  Pt has insulin pump and manages her own blood sugars per pump, see previous note.  Hospitalist consult done (see note).   Significant other at bedside and supportive.  Incision covered with foam tape; clean, dry, intact. No signs of infection noted. Pain well managed with Toradol and Tylenol.  Parents educated on postpartum cares, unit routines, and infant safety. Positive bonding with infant observed.  Continue to monitor.

## 2022-02-22 NOTE — PROVIDER NOTIFICATION
02/22/22 0500   Provider Notification   Provider Name/Title Dr Mortensen   Method of Notification Phone   Request Evaluate-Remote   Notification Reason Vital Signs Change     Urine output 22 ml/hr. Current blood sugar 91, provided apple juice. Per provider, start 500ml bolus of LR and continue with ordered morning labs.

## 2022-02-22 NOTE — PLAN OF CARE
During N&V pt states her blood sugars did not go below 70.  Her pre prandial glucose was 160 at 2000 and her pump corrected with 1.02 units @ 2014.  Her HS blood glucose was 143, no correction per pt.

## 2022-02-22 NOTE — PROVIDER NOTIFICATION
22   Provider Notification   Provider Name/Title Dr. Mortensen   Method of Notification Phone   Request Evaluate - Remote   Notification Reason Status Update   Discussed with MD that pt's taylor was taken out at 1530. Pt hasn't been able to void since and bladder scan results were 20-30ml. D5LR running at 125ml/hr. Pt has been vomiting since she finished her recovery period. Pt reports feeling better since zofran was given at 1830 and so has been sipping a little bit of fluid more recently. Pt reports her blood sugars have been in range t/o , recovery, and nausea/vomiting. (Pt on insulin pump for type I DM.) BP at 1930 138/62, 1830 150/75.     MD orders IVF bolus of LR 500ml and Lasix 20mg IV x1 dose. Call if SBP >155, if urine output is continuing to be a concern, and/or as needed.    Updated primary RN, Cristy LITTLE

## 2022-02-22 NOTE — PLAN OF CARE
Patient blood pressures have been elevated. Edema in legs, otherwise denies PreE symptoms. BP at 0430 was 158/71 but -59 with recheck. See prior notes regarding urination issues (Long replaced at 0100, has had poor UOP since, completed bolus at 0600 and remained at 25 ml/hr). PIV running D5LR @125. Nausea has greatly improved and is now tolerating fluids. Is not passing gas yet. Blood sugars have remained stable per patient's pump, she did not require corrections overnight. Ambulating steadily, independently. Does have some dizziness when first gets to side of bed. Fundal checks WDL. Lochia is scant, no clots. Incisional dressing intact with foam tape over. Pain adequately managed with Tylenol and Toradol. Infant breastfeeding every 2-3 hours. Mother reports little sleep overnight. Did discuss introducing donor milk for a feeding this morning to help get mother some rest- they are interested in this. Father of baby present and supportive. Both parents are attentive to infant.

## 2022-02-22 NOTE — PROVIDER NOTIFICATION
02/22/22 1237   Provider Notification   Provider Name/Title Dr. Moser   Method of Notification At Bedside   Notification Reason Status Update   Reported output of 225 from [id number], po intake.  Orders to discontinue IV fluids, taylor to remain until 0100.  Iron infusion orders going in now -- give at convenient time for pt.

## 2022-02-22 NOTE — PROVIDER NOTIFICATION
02/22/22 0815   Provider Notification   Provider Name/Title Dr. Moser   Method of Notification Phone   Notification Reason Lab Results;Status Update   Reported hgb result: 7.5. Reported taylor reinserted at 0100 with a total output of 625 ml (from delivery until 0800) , 125 ml of that since 0650. Pt received 3 boluses (500 ml each) on eves/night shifts and is now on D5LR at 125 ml/hr.  Pt is taking PO fluids - 1800 as of 0800 today.  Dr. Moser reports urine output is adequate now but will continue to monitor throughout the day and give update around 1400. Dr. Moser ordering an iron infusion if pt agrees and starting Procardia 30 XL.  Blood pressures will continue Q4H, notify MD if severe (160/110).  Keep taylor in 24 hours - until 0100 and continue D5LR at 125 ml/hr for now.  Discussed pt's diabetes, pt's insulin pump and no need for dietician consult. Pt may continue Toradol and then Ibuprofen.  Repeat labs in morning.

## 2022-02-22 NOTE — PLAN OF CARE
Vital signs stable with no severe blood pressures today. Pt started on Procardia 30 mg XL this morning.  Pain managed with Tylenol and Ibuprofen.  Pt has had a headache that started mid-morning but has mostly resolved with food, hydration and rest.  Pt reports no visual disturbances or RUQ pain.  Output via taylor has  increased substantially and is more than adequate now.  Taylor to be discontinued at 0100.  IV fluids discontinued per MD.  Iron infusion ordered for 7.5 hemoglobin this morning, completed, pt tolerated well. Pt has been using her own insulin pump which automatically records her glucose levels and adjusts as needed.  Pt's gluocose levels recorded in the OB PCS under comments.  Before breakfast: 96, no correction.  Before lunch: 183 with 1.12  unit auto correction and then 3.02 units to cover lunch.

## 2022-02-22 NOTE — CONSULTS
"NUTRITION ASSESSMENT    REASON FOR ASSESSMENT:    3- MD Order - Nutrition Education     CURRENT DIET AND NOURISHMENT ORDER:  Information obtained from chart     Diet: ADAT: Regular Diet     Current Intake/Tolerance: No information recorded in the flowsheets to comment on     Per MD note on 2/21: \"In regard to diabetic management, patient is managed with an insulin pump.  She follows with an endocrinologist.  She reports her blood sugars were well controlled during pregnancy with a hemoglobin A1c near 6 during her pregnancy.  Her endocrinologist did provide her with recommendations for pump management post-delivery, and the patient implemented these after delivery earlier today.  Blood sugars today have been in the  range, well controlled.  The patient would prefer to manage her diabetes while in the hospital with her pump.  She has a long history of diabetes mellitus and reports using pumps since 18 years of age.  She has been using her current pump for the past 1 year, and it has been functioning well for her.\"     NUTRITION STATUS VALIDATION:  Weight status: Obesity Grade I BMI 30-34.9    MALNUTRITION:  Unable to assess    NUTRITION DIAGNOSIS:  No nutrition diagnosis at this time.    INTERVENTION:    Nutrition Prescription:     Diabetes intervention not indicated, pt with well controlled diabetes per MD note. Spoke with RN via phone.     Anticipate patient will discharge on regular diet    Implementation:      Diet education - per MD order or as appropriate    Follow Up/Monitoring:      No need for further follow up unless another consult is received. Pt to follow up outpatient with endocrinologist for further education.     Antonette Gu, RENAE, LD  Clinical Dietitian     "

## 2022-02-22 NOTE — PROVIDER NOTIFICATION
02/22/22 0100   Provider Notification   Provider Name/Title Dr Mortensen   Method of Notification Phone   Request Evaluate-Remote   Notification Reason Status Update     Pt has not voided since catheter removal at 1530. Bladder scan shows 67ml in bladder, but difficulty getting accurate scan due to large incisional dressing in place.     Provider order placed to replace taylor catheter at this time and will revisit concern in the morning.

## 2022-02-22 NOTE — PROGRESS NOTES
Spoke with RN and I reviewed chart.  Patient's UOP adequate this AM.  IVF off, continue PO fluids, ambulation.  IV iron ordered for today.  BP reviewed and at goal. Pt had HA this AM likely unrelated to procardia XR, will monitor tomorrow after taking 2nd dose.  Plan on taylor out at 1AM 2/23.

## 2022-02-22 NOTE — PROGRESS NOTES
[facility]  Hospitalist Progress Note  Jensen Herrera MD 2022    Reason for Consult (Diagnosis): Type 1 DM         Assessment and Plan:      Summary of Stay: Audra Stewart is a 36-year-old female who presented for elective  at approximately 39 weeks' gestation.  She reports an unremarkable pregnancy.  The patient is a type 1 diabetic and manages insulin with insulin pump.  She has had no issues with diabetic management during pregnancy with good control of blood sugars.  Hospitalist service was consulted to assist with diabetes management.     On admit she was noted to have elevated blood pressure and protein on urinalysis concerning for possible preeclampsia.  This is being managed by OB service    1.  Diabetes mellitus type 1.    - Long history of management on insulin pump.    - the patient is managing her diabetes while hospitalized with the use of her insulin pump.  She follows with an endocrinologist, who provided pump settings for her to use after delivery.  These settings have been implemented with excellent blood sugar control so far today post-delivery.  The patient is very comfortable with this plan and wishes to continue to manage her diabetes mellitus with her pump while hospitalized.    2.  Possible preeclampsia:    - Management per OB/GYN service.  Noted to have elevated blood pressures and protein in urine.    - pt has been started on nifedipine  - ALT and AST normal  - platelet count 140       DVT Prophylaxis: Defer to primary service  Code Status: Full Code  Discharge Dispo: home  Estimated Disch Date / # of Days until Disch: when OK with primary service        Interval History (Subjective):      No complaints today.  Blood sugars remain well controlled on current pump settings.  Has had no alerts indicating hypoglycemia or significant hyperglycemia.  Nifedipine started by OB service for HTN                  Physical Exam:      Last Vital Signs:  /64  "(BP Location: Right arm, Patient Position: Semi-Washington's)   Pulse 59   Temp 98  F (36.7  C) (Oral)   Resp 18   Ht 1.702 m (5' 7\")   Wt 97.1 kg (214 lb)   SpO2 98%   BMI 33.52 kg/m        Intake/Output Summary (Last 24 hours) at 2/22/2022 1154  Last data filed at 2/22/2022 1100  Gross per 24 hour   Intake 5325 ml   Output 1624 ml   Net 3701 ml       Constitutional: Awake, alert, cooperative, no apparent distress.  I was in patient's room visiting with her; baby and sig other present.  I did not perform a full physical exam on the patient today   Respiratory:    Cardiovascular:    Abdomen:    Skin:    Neuro:    Extremities:    Other(s):        All other systems: Negative          Medications:      All current medications were reviewed with changes reflected in problem list.         Data:      All new lab and imaging data was reviewed.   Labs:  Recent Labs   Lab 02/22/22  0658 02/21/22  1100 02/20/22  0923     --   --    POTASSIUM 3.9  --   --    CHLORIDE 107  --   --    CO2 26  --   --    ANIONGAP 4  --   --    GLC 73 86  --    BUN 10  --   --    CR 0.71  --  0.66   GFRESTIMATED >90  --  >90   EDDA 7.4*  --   --      Recent Labs   Lab 02/22/22  0658   WBC 9.9   HGB 7.5*   HCT 24.8*   MCV 83   *      Imaging:   No results found for this or any previous visit (from the past 24 hour(s)).   "

## 2022-02-23 LAB
ALBUMIN SERPL-MCNC: 1.6 G/DL (ref 3.4–5)
ALP SERPL-CCNC: 145 U/L (ref 40–150)
ALT SERPL W P-5'-P-CCNC: 18 U/L (ref 0–50)
ANION GAP SERPL CALCULATED.3IONS-SCNC: 3 MMOL/L (ref 3–14)
AST SERPL W P-5'-P-CCNC: 35 U/L (ref 0–45)
BILIRUB SERPL-MCNC: 0.2 MG/DL (ref 0.2–1.3)
BUN SERPL-MCNC: 7 MG/DL (ref 7–30)
CALCIUM SERPL-MCNC: 7.7 MG/DL (ref 8.5–10.1)
CHLORIDE BLD-SCNC: 114 MMOL/L (ref 94–109)
CO2 SERPL-SCNC: 26 MMOL/L (ref 20–32)
CREAT SERPL-MCNC: 0.62 MG/DL (ref 0.52–1.04)
ERYTHROCYTE [DISTWIDTH] IN BLOOD BY AUTOMATED COUNT: 14.7 % (ref 10–15)
GFR SERPL CREATININE-BSD FRML MDRD: >90 ML/MIN/1.73M2
GLUCOSE BLD-MCNC: 53 MG/DL (ref 70–99)
HCT VFR BLD AUTO: 23.9 % (ref 35–47)
HGB BLD-MCNC: 7.2 G/DL (ref 11.7–15.7)
MCH RBC QN AUTO: 25.2 PG (ref 26.5–33)
MCHC RBC AUTO-ENTMCNC: 30.1 G/DL (ref 31.5–36.5)
MCV RBC AUTO: 84 FL (ref 78–100)
PLATELET # BLD AUTO: 152 10E3/UL (ref 150–450)
POTASSIUM BLD-SCNC: 3.8 MMOL/L (ref 3.4–5.3)
PROT SERPL-MCNC: 5 G/DL (ref 6.8–8.8)
RBC # BLD AUTO: 2.86 10E6/UL (ref 3.8–5.2)
SODIUM SERPL-SCNC: 143 MMOL/L (ref 133–144)
WBC # BLD AUTO: 8.5 10E3/UL (ref 4–11)

## 2022-02-23 PROCEDURE — 250N000013 HC RX MED GY IP 250 OP 250 PS 637: Performed by: OBSTETRICS & GYNECOLOGY

## 2022-02-23 PROCEDURE — 36415 COLL VENOUS BLD VENIPUNCTURE: CPT | Performed by: OBSTETRICS & GYNECOLOGY

## 2022-02-23 PROCEDURE — 120N000001 HC R&B MED SURG/OB

## 2022-02-23 PROCEDURE — 85014 HEMATOCRIT: CPT | Performed by: OBSTETRICS & GYNECOLOGY

## 2022-02-23 PROCEDURE — 99232 SBSQ HOSP IP/OBS MODERATE 35: CPT | Performed by: HOSPITALIST

## 2022-02-23 PROCEDURE — 80053 COMPREHEN METABOLIC PANEL: CPT | Performed by: OBSTETRICS & GYNECOLOGY

## 2022-02-23 RX ADMIN — IBUPROFEN 800 MG: 800 TABLET ORAL at 18:42

## 2022-02-23 RX ADMIN — PRENATAL VITAMINS-IRON FUMARATE 27 MG IRON-FOLIC ACID 0.8 MG TABLET 1 TABLET: at 09:07

## 2022-02-23 RX ADMIN — IBUPROFEN 800 MG: 800 TABLET ORAL at 23:49

## 2022-02-23 RX ADMIN — ACETAMINOPHEN 975 MG: 325 TABLET, FILM COATED ORAL at 18:42

## 2022-02-23 RX ADMIN — ACETAMINOPHEN 975 MG: 325 TABLET, FILM COATED ORAL at 12:28

## 2022-02-23 RX ADMIN — IBUPROFEN 800 MG: 800 TABLET ORAL at 06:36

## 2022-02-23 RX ADMIN — IBUPROFEN 800 MG: 800 TABLET ORAL at 12:29

## 2022-02-23 RX ADMIN — SENNOSIDES AND DOCUSATE SODIUM 1 TABLET: 50; 8.6 TABLET ORAL at 23:49

## 2022-02-23 RX ADMIN — ACETAMINOPHEN 975 MG: 325 TABLET, FILM COATED ORAL at 06:36

## 2022-02-23 RX ADMIN — NIFEDIPINE 30 MG: 30 TABLET, FILM COATED, EXTENDED RELEASE ORAL at 09:07

## 2022-02-23 RX ADMIN — IBUPROFEN 800 MG: 800 TABLET ORAL at 00:22

## 2022-02-23 RX ADMIN — SENNOSIDES AND DOCUSATE SODIUM 2 TABLET: 50; 8.6 TABLET ORAL at 09:07

## 2022-02-23 RX ADMIN — ACETAMINOPHEN 975 MG: 325 TABLET, FILM COATED ORAL at 00:22

## 2022-02-23 ASSESSMENT — ACTIVITIES OF DAILY LIVING (ADL)
ADLS_ACUITY_SCORE: 3

## 2022-02-23 NOTE — LACTATION NOTE
Lactation in to see patient. Baby crying doing STS at time of visit. Patient states things going well. Was brief, Audra wanting to focus of baby at that time.

## 2022-02-23 NOTE — PROGRESS NOTES
[facility]   Postpartum Progress Note    S: Feeling a lot better this AM than yesterday. Voiding and has showered today. Ambulating without dizziness, eating and drinking without nausea. Lochia less than a typical period. Pain well controlled on oral medications. Working on breastfeeding.     O:  Vitals:    22 0019 22 0451 22 0900 22 1145   BP: 137/59 (!) 141/65 (!) 157/72 134/63   BP Location: Right arm Left arm Right arm Right arm   Patient Position: Semi-Washington's Semi-Washington's Sitting Semi-Washington's   Pulse: 77 72 78 67   Resp: 18 16  16   Temp: 97.6  F (36.4  C) 98  F (36.7  C) 97.8  F (36.6  C) 98.2  F (36.8  C)   TempSrc: Oral Oral  Oral   SpO2:       Weight:       Height:         Gen: Resting in bed, NAD  CV: RRR, extremities warm and well perfused  Lungs: Breathing comfortably on room air  Abd: Soft, nondistended, FF below umbilicus  Incision: dry, intact, no erythema or oozing  Ext: +1 edema, no erythema    Hemoglobin   Date Value Ref Range Status   2022 7.2 (L) 11.7 - 15.7 g/dL Final   2022 7.5 (L) 11.7 - 15.7 g/dL Final     A: 36 year old  POD#2 s/p repeat CS. Pregnancy notable for type 1 diabetes, pre-eclampsia w/o severe features and iron deficiency anemia.      Labs:   Hgb 7.2  Plt 152  Creatinine 0.62  AST 35  ALT 18    P:   Routine post-operative care. Encourage ambulation  Heme: 9.1>>7.5>7.2, acute blood loss anemia on chronic iron deficiency anemia, hgb stable s/p IV iron x1, continue PO iron on discharge, declines blood transfusion at this time  Rh positive, no Rhogam indicated. Rubella immune  Pain well controlled on oral tylenol and ibuprofen. Has not needed roxicodone.   Infant: Doing well, breastfeeding  Birth control: LNG-IUD      Pre-eclampsia w/o severe features  - PreE labs notable for UPC of 2.08 and platelets of 140, o/w normal, recheck this AM normal   - BP largely normotensive on nifedipine 30mg, continue   - h/o bicuspid  aortic valve, last echo 1/27/22 w/o signs of aortic stenosis and mild AR, no evidence of aortic dilation, EF 65%    DM1  - hospitalist consult, patient running insulin off pump and well controlled  - f/u w/ endocrine outpatient     Postoperative urinary retention   - resolved s/p taylor     Anticipate discharge home POD#3    Leah C. Henke, MD [facility]  02/23/22

## 2022-02-23 NOTE — PLAN OF CARE
Vital signs stable with one elevated blood pressure this morning (157/72) just prior to Procardia XL scheduled administration. Pt reports no headache, visual disturbances or RUQ pain. Edema has decreased since yesterday.  Reflexes 2+, no clonus.  Pain managed with Tylenol and Ibuprofen. Pt out of bed more today and ambulating well.  Voiding without difficulty.  Pt is monitoring her own glucose/insulin via her insulin pump and remains very stable.  Blood glucose at breakfast: 69, no correction.  Lunch: 136 with 0.16 unit correction.  Incision is approximated with glue, some old drainage noted, no signs of infection.   at home with other child this morning.  Bonding well with baby.  Will continue to monitor.     Care continued from 1500 - 1930:  Pt remains stable.  Blood pressures have responded well to Procardia. Passing gas, BM this afternoon.  Plan is for discharge tomorrow.   now at bedside and supportive of pt.

## 2022-02-23 NOTE — PROGRESS NOTES
[facility]    Hospitalist Progress Note    Date of Service (when I saw the patient): 2022  Provider:  Mert Cha MD   Text Page  7am - 6PM       Assessment & Plan   Summary of Stay: Audra Stewart is a 36-year-old female who presented for elective  at approximately 39 weeks' gestation.  She reports an unremarkable pregnancy.  The patient is a type 1 diabetic and manages insulin with insulin pump.  She has had no issues with diabetic management during pregnancy with good control of blood sugars.  Hospitalist service was consulted to assist with diabetes management.      On admit she was noted to have elevated blood pressure and protein on urinalysis concerning for possible preeclampsia.  This is being managed by OB service     1.  Diabetes mellitus type 1.    - Long history of management on insulin pump.    - Use of her insulin pump while here.  She follows with an endocrinologist, who provided pump settings for her to use after delivery.  With current settings BS control is good. She has been asymptomatic, denies hypoglycemia symptoms. Early today had BS 53 charted but she denies any symptoms.    2.  Possible preeclampsia:    - Management per OB/GYN service.  Noted to have elevated blood pressures and protein in urine.    - Started on nifedipine  - ALT and AST normal  - platelet count 152 today        DVT Prophylaxis: Defer to primary service  Code Status: Full Code    Disposition: Expected discharge to home per Ob-Gyn plan.    Interval History   She feels fine overall and denies symptoms.     -Data reviewed today: I reviewed all new labs and imaging results over the last 24 hours.     Physical Exam   Temp: 98  F (36.7  C) Temp src: Oral BP: (Abnormal) 141/65 Pulse: 72   Resp: 16 SpO2: 97 % O2 Device: None (Room air)    Vitals:    22 1142   Weight: 97.1 kg (214 lb)     Vital Signs with Ranges  Temp:  [97.6  F (36.4  C)-98.1  F (36.7  C)] 98  F (36.7  C)  Pulse:   [59-80] 72  Resp:  [16-18] 16  BP: (111-141)/(51-71) 141/65  SpO2:  [95 %-98 %] 97 %  I/O last 3 completed shifts:  In: 4660.42 [P.O.:2970; I.V.:1690.42]  Out: 4740 [Urine:4740]    GEN:  Alert, oriented x 3, appears comfortable, NAD.  HEENT:  Normocephalic/atraumatic, no scleral icterus, no nasal discharge, mouth moist.  CV:  Regular rate and rhythm, no murmur or JVD.  S1 + S2 noted, no S3 or S4.  LUNGS:  Clear to auscultation bilaterally without rales/rhonchi/wheezing/retractions.  Symmetric chest rise on inhalation noted.  ABD:  Active bowel sounds, soft, non-tender/non-distended.  No rebound/guarding/rigidity.  EXT:  No edema or cyanosis.  No joint synovitis noted.  SKIN:  Dry to touch, no exanthems noted in the visualized areas.       Medications     - MEDICATION INSTRUCTIONS -       - MEDICATION INSTRUCTIONS -       oxytocin in 0.9% NaCl         acetaminophen  975 mg Oral Q6H     ibuprofen  800 mg Oral Q6H     iron sucrose (VENOFER) intermittent infusion  300 mg Intravenous Q72H     NIFEdipine ER OSMOTIC  30 mg Oral Daily     prenatal multivitamin w/iron  1 tablet Oral Daily     senna-docusate  1 tablet Oral BID    Or     senna-docusate  2 tablet Oral BID     sodium chloride (PF)  3 mL Intracatheter Q8H       Data   Recent Labs   Lab 02/23/22  0640 02/22/22  0658 02/21/22  1100 02/20/22  0923 02/20/22  0923   WBC 8.5 9.9  --   --   --    HGB 7.2* 7.5* 9.1*  --  9.1*   MCV 84 83  --   --   --     140*  --   --   --     137  --   --   --    POTASSIUM 3.8 3.9  --   --   --    CHLORIDE 114* 107  --   --   --    CO2 26 26  --   --   --    BUN 7 10  --   --   --    CR 0.62 0.71  --   --  0.66   ANIONGAP 3 4  --   --   --    EDDA 7.7* 7.4*  --   --   --    GLC 53* 73 86  --   --    ALBUMIN 1.6* 1.6*  --   --   --    PROTTOTAL 5.0* 4.7*  --   --   --    BILITOTAL 0.2 0.3  --   --   --    ALKPHOS 145 160*  --   --   --    ALT 18 19 23   < >  --    AST 35 39 31   < >  --     < > = values in this interval not  displayed.       No results found for this or any previous visit (from the past 24 hour(s)).          Disclaimer: This note consists of symbols derived from keyboarding, dictation and/or voice recognition software. As a result, there may be errors in the script that have gone undetected. Please consider this when interpreting information found in this chart.

## 2022-02-23 NOTE — PLAN OF CARE
Patient vitally stable, BPs have been WDL. Has been reporting a headache on and off all day, but otherwise denies PreE symptoms. Edema is improving. Catheter removed at 0030, has voided several times since without difficulty. PIV remains saline locked. Not yet passing gas. Tolerating regular diet. HS blood sugar was 91. Ambulating steadily, independently. Showered overnight. Fundal checks WDL. Lochia is scant, no clots. Incision approximated with some dried drainage, no signs of infection. Pain adequately managed with Tylenol and Ibuprofen. Infant breastfeeding every 2-3 hours. Milk is in. Father of baby present and supportive. Both parents are attentive to infant.

## 2022-02-24 VITALS
TEMPERATURE: 97.8 F | OXYGEN SATURATION: 97 % | RESPIRATION RATE: 18 BRPM | BODY MASS INDEX: 33.59 KG/M2 | SYSTOLIC BLOOD PRESSURE: 135 MMHG | WEIGHT: 214 LBS | HEIGHT: 67 IN | HEART RATE: 73 BPM | DIASTOLIC BLOOD PRESSURE: 70 MMHG

## 2022-02-24 PROCEDURE — 99232 SBSQ HOSP IP/OBS MODERATE 35: CPT | Performed by: HOSPITALIST

## 2022-02-24 PROCEDURE — 250N000013 HC RX MED GY IP 250 OP 250 PS 637: Performed by: OBSTETRICS & GYNECOLOGY

## 2022-02-24 RX ORDER — HYDROCORTISONE 2.5 %
CREAM (GRAM) TOPICAL 3 TIMES DAILY PRN
Qty: 30 G | Refills: 0 | Status: SHIPPED | OUTPATIENT
Start: 2022-02-24

## 2022-02-24 RX ORDER — NIFEDIPINE 30 MG
30 TABLET, EXTENDED RELEASE ORAL DAILY
Qty: 30 TABLET | Refills: 1 | Status: SHIPPED | OUTPATIENT
Start: 2022-02-25

## 2022-02-24 RX ORDER — ACETAMINOPHEN 325 MG/1
975 TABLET ORAL EVERY 6 HOURS
Qty: 30 TABLET | Refills: 1 | Status: SHIPPED | OUTPATIENT
Start: 2022-02-24

## 2022-02-24 RX ORDER — OXYCODONE HYDROCHLORIDE 5 MG/1
5 TABLET ORAL EVERY 6 HOURS PRN
Qty: 20 TABLET | Refills: 0 | Status: SHIPPED | OUTPATIENT
Start: 2022-02-24

## 2022-02-24 RX ORDER — IBUPROFEN 800 MG/1
800 TABLET, FILM COATED ORAL EVERY 6 HOURS
Qty: 40 TABLET | Refills: 1 | Status: SHIPPED | OUTPATIENT
Start: 2022-02-24

## 2022-02-24 RX ORDER — AMOXICILLIN 250 MG
1 CAPSULE ORAL 2 TIMES DAILY
Qty: 30 TABLET | Refills: 0 | Status: SHIPPED | OUTPATIENT
Start: 2022-02-24

## 2022-02-24 RX ORDER — MODIFIED LANOLIN
OINTMENT (GRAM) TOPICAL
Qty: 7 G | Refills: 1 | Status: SHIPPED | OUTPATIENT
Start: 2022-02-24

## 2022-02-24 RX ADMIN — SENNOSIDES AND DOCUSATE SODIUM 1 TABLET: 50; 8.6 TABLET ORAL at 09:03

## 2022-02-24 RX ADMIN — IBUPROFEN 800 MG: 800 TABLET ORAL at 06:37

## 2022-02-24 RX ADMIN — NIFEDIPINE 30 MG: 30 TABLET, FILM COATED, EXTENDED RELEASE ORAL at 09:03

## 2022-02-24 RX ADMIN — ACETAMINOPHEN 975 MG: 325 TABLET, FILM COATED ORAL at 06:37

## 2022-02-24 RX ADMIN — IBUPROFEN 800 MG: 800 TABLET ORAL at 12:59

## 2022-02-24 RX ADMIN — PRENATAL VITAMINS-IRON FUMARATE 27 MG IRON-FOLIC ACID 0.8 MG TABLET 1 TABLET: at 09:03

## 2022-02-24 RX ADMIN — ACETAMINOPHEN 975 MG: 325 TABLET, FILM COATED ORAL at 00:58

## 2022-02-24 RX ADMIN — ACETAMINOPHEN 975 MG: 325 TABLET, FILM COATED ORAL at 12:59

## 2022-02-24 ASSESSMENT — ACTIVITIES OF DAILY LIVING (ADL)
ADLS_ACUITY_SCORE: 3

## 2022-02-24 NOTE — PLAN OF CARE
Vital signs are stable.  One elevated blood pressure just prior to Procardia administration this morning.  Pt report no visual disturbances, no RUQ.  Pt has a slight headache but attributes it to needing caffeine.  No clonus, reflexes 2+, edema continues to improve.  Fundus firm, lochia scant.  Incision is approximated with liquid band-aid, no signs of infection.  at bedside and supportive. Bonding well with baby.      Discharge instructions reviewed with pt.  All questions and concerns addressed. Pt verbalized understanding.  Discharge medications reviewed and given along with prescription for Oxycodone. Blood pressure cuff ordered and given.  Pt discharged home with all of her belongings in stable condition accompanied by  at 1345.

## 2022-02-24 NOTE — PROGRESS NOTES
[facility]    Hospitalist Progress Note    Date of Service (when I saw the patient): 2022  Provider:  Mert Cha MD   Text Page  7am - 6PM       Assessment & Plan   Summary of Stay: Audra Stewart is a 36-year-old female who presented for elective  at approximately 39 weeks' gestation.  She reports an unremarkable pregnancy.  The patient is a type 1 diabetic and manages insulin with insulin pump.  She has had no issues with diabetic management during pregnancy with good control of blood sugars.  Hospitalist service was consulted to assist with diabetes management.      On admit she was noted to have elevated blood pressure and protein on urinalysis concerning for possible preeclampsia.  This is being managed by OB service     1.  Diabetes mellitus type 1.    - Long history of management on insulin pump.    - Use of her insulin pump while here.  She follows with an endocrinologist, who provided pump settings for her to use after delivery.  With current settings BS control is good. She has been asymptomatic, denies hypoglycemia symptoms, despite low readings after midnight corrected by the patient with glucose tab. .    2.  Possible preeclampsia:    - Management per OB/GYN service.  Noted to have elevated blood pressures and protein in urine.    - Started on nifedipine  - ALT and AST normal  - last platelet count 152         DVT Prophylaxis: Defer to primary service  Code Status: Full Code    Disposition: Expected discharge to home per Ob-Gyn plan. Continue current and follow uo with her Endo.     Interval History   She feels fine overall and denies symptoms.     -Data reviewed today: I reviewed all new labs and imaging results over the last 24 hours.     Physical Exam   Temp: 97.9  F (36.6  C) Temp src: Oral BP: 118/57 Pulse: 75   Resp: 18        Vitals:    22 1142   Weight: 97.1 kg (214 lb)     Vital Signs with Ranges  Temp:  [97.8  F (36.6  C)-98.2  F (36.8  C)]  97.9  F (36.6  C)  Pulse:  [67-78] 75  Resp:  [16-18] 18  BP: (114-157)/(57-72) 118/57  No intake/output data recorded.    GEN:  Alert, oriented x 3, appears comfortable, NAD.  HEENT:  Normocephalic/atraumatic, no scleral icterus, no nasal discharge, mouth moist.  CV:  Regular rate and rhythm, no murmur or JVD.  S1 + S2 noted, no S3 or S4.  LUNGS:  Clear to auscultation bilaterally without rales/rhonchi/wheezing/retractions.  Symmetric chest rise on inhalation noted.  ABD:  Active bowel sounds, soft, non-tender/non-distended.  No rebound/guarding/rigidity.  EXT:  No edema or cyanosis.  No joint synovitis noted.  SKIN:  Dry to touch, no exanthems noted in the visualized areas.       Medications     - MEDICATION INSTRUCTIONS -       - MEDICATION INSTRUCTIONS -       oxytocin in 0.9% NaCl         acetaminophen  975 mg Oral Q6H     ibuprofen  800 mg Oral Q6H     iron sucrose (VENOFER) intermittent infusion  300 mg Intravenous Q72H     NIFEdipine ER OSMOTIC  30 mg Oral Daily     prenatal multivitamin w/iron  1 tablet Oral Daily     senna-docusate  1 tablet Oral BID    Or     senna-docusate  2 tablet Oral BID     sodium chloride (PF)  3 mL Intracatheter Q8H       Data   Recent Labs   Lab 02/23/22  0640 02/22/22  0658 02/21/22  1100 02/20/22  0923 02/20/22  0923   WBC 8.5 9.9  --   --   --    HGB 7.2* 7.5* 9.1*  --  9.1*   MCV 84 83  --   --   --     140*  --   --   --     137  --   --   --    POTASSIUM 3.8 3.9  --   --   --    CHLORIDE 114* 107  --   --   --    CO2 26 26  --   --   --    BUN 7 10  --   --   --    CR 0.62 0.71  --   --  0.66   ANIONGAP 3 4  --   --   --    EDDA 7.7* 7.4*  --   --   --    GLC 53* 73 86  --   --    ALBUMIN 1.6* 1.6*  --   --   --    PROTTOTAL 5.0* 4.7*  --   --   --    BILITOTAL 0.2 0.3  --   --   --    ALKPHOS 145 160*  --   --   --    ALT 18 19 23   < >  --    AST 35 39 31   < >  --     < > = values in this interval not displayed.       No results found for this or any previous  visit (from the past 24 hour(s)).    Disclaimer: This note consists of symbols derived from keyboarding, dictation and/or voice recognition software. As a result, there may be errors in the script that have gone undetected. Please consider this when interpreting information found in this chart.

## 2022-02-24 NOTE — DISCHARGE SUMMARY
[facility] Discharge Summary    Audra Stewart MRN# [id number]   Age: 36 year old YOB: 1985     Date of Admission:  2022  Date of Discharge::  2022  Admitting Physician:  Sejal Mortensen MD  Discharge Physician:  Jenni Myles MD     Home clinic: [facility]          Admission Diagnoses:   Previous  section [Z98.891]  Type 1 diabetes mellitus (H) [E10.9]  S/P repeat low transverse  [Z98.891]          Discharge Diagnosis:   Previous  section [Z98.891]  Type 1 diabetes mellitus (H) [E10.9]          Procedures:   Procedure(s): Repeat low transverse  section       No other procedures performed during this admission           Medications Prior to Admission:     Medications Prior to Admission   Medication Sig Dispense Refill Last Dose     insulin lispro (HUMALOG) 100 UNIT/ML injection by Device route See Admin Instructions        Prenatal Vit-Fe Fumarate-FA (PNV PRENATAL PLUS MULTIVITAMIN) 27-1 MG TABS per tablet Take 1 tablet by mouth daily        INSULIN PUMP - OUTPATIENT Inject Subcutaneous See Admin Instructions Date last updated:  19  Medtronic Pump  BASAL RATES and times:  3650-4730: 1.4 units/hour   1103-8990: 1.7 units/hour   4095-6661: 1 units/hour   6110-4824: 1.5 units/hour   9930-4758: 1.55 units/hour  0571-4116: 1.25 units/hour.  CARB RATIO and times:  8287-4401: 1:5 (1unit cover 5grams carbs)  1898-8212: 1:3.5  0867-5957:  PM:  1:4  Corection Factor (Sensitivity) and times:  7539-9458: 18 mg/dL  7348-3921: 28 mg/dL  BLOOD GLUCOSE TARGET and times:  8239-7988: 100-120  7034-2385:  90 - 100  4045-5073:  100 - 120  Active Insulin Time:  3 hours  Insulin site change due today (19)  Pt has dexcom continuous glucose monitor  [facility]        [DISCONTINUED] aspirin (ASA) 81 MG chewable tablet Take 81 mg by mouth daily                Discharge Medications:     Current Discharge  Medication List      START taking these medications    Details   acetaminophen (TYLENOL) 325 MG tablet Take 3 tablets (975 mg) by mouth every 6 hours  Qty: 30 tablet, Refills: 1    Associated Diagnoses: S/P repeat low transverse       hydrocortisone 2.5 % cream Place rectally 3 times daily as needed (hemorrhoids)  Qty: 30 g, Refills: 0    Associated Diagnoses: S/P repeat low transverse       ibuprofen (ADVIL/MOTRIN) 800 MG tablet Take 1 tablet (800 mg) by mouth every 6 hours  Qty: 40 tablet, Refills: 1    Associated Diagnoses: S/P repeat low transverse       lanolin ointment Apply topically every hour as needed for dry skin (soreness)  Qty: 7 g, Refills: 1    Associated Diagnoses: S/P repeat low transverse       NIFEdipine ER OSMOTIC (ADALAT CC) 30 MG 24 hr tablet Take 1 tablet (30 mg) by mouth daily  Qty: 30 tablet, Refills: 1    Associated Diagnoses: S/P repeat low transverse       oxyCODONE (ROXICODONE) 5 MG tablet Take 1 tablet (5 mg) by mouth every 6 hours as needed for moderate to severe pain  Qty: 20 tablet, Refills: 0    Associated Diagnoses: S/P repeat low transverse       senna-docusate (SENOKOT-S/PERICOLACE) 8.6-50 MG tablet Take 1 tablet by mouth 2 times daily  Qty: 30 tablet, Refills: 0    Associated Diagnoses: S/P repeat low transverse          CONTINUE these medications which have NOT CHANGED    Details   insulin lispro (HUMALOG) 100 UNIT/ML injection by Device route See Admin Instructions      Prenatal Vit-Fe Fumarate-FA (PNV PRENATAL PLUS MULTIVITAMIN) 27-1 MG TABS per tablet Take 1 tablet by mouth daily      INSULIN PUMP - OUTPATIENT Inject Subcutaneous See Admin Instructions Date last updated:  19  Medtronic Pump  BASAL RATES and times:  0954-4007: 1.4 units/hour   3091-7349: 1.7 units/hour   8635-7958: 1 units/hour   5926-2924: 1.5 units/hour   0800-9174: 1.55 units/hour  5326-2032: 1.25 units/hour.  CARB RATIO and  times:  3373-9729: 1:5 (1unit cover 5grams carbs)  4922-5740: 1:3.5  4008-8998:  PM:  1:4  Corection Factor (Sensitivity) and times:  8617-7940: 18 mg/dL  2355-5531: 28 mg/dL  BLOOD GLUCOSE TARGET and times:  9164-5054: 100-120  0728-7975:  90 - 100  1671-7186:  100 - 120  Active Insulin Time:  3 hours  Insulin site change due today (19)  Pt has dexcom continuous glucose monitor  [facility]         STOP taking these medications       aspirin (ASA) 81 MG chewable tablet Comments:   Reason for Stopping:                     Consultations:   Consultation during this admission received from internal medicine          Brief History of Labor or Admission:   Indication for : history of  delivery, suspected macrosomia in the setting of T1DM with recommendations for repeat CD.     Procedure: Final verification of patient and procedure was done. The patient was placed in the supine position with left lateral tilt and was prepped and draped in the usual sterile fashion. 2 g of Cefazolin were given preoperatively. A pfannenstiel skin incision was made with the scalpel and carried to the level of the fascia using the cautery. The rectus fascia was dissected off of the muscle and the peritoneal cavity was entered bluntly. A bladder flap was created and the Sander retractor was placed for protection of the bladder. A low transverse uterine incision was made and extended laterally bluntly.   Entry into the amniotic sac revealed clear fluid. Under controlled conditions, the fetal head was delivered from BARBARA position. The nose and oropharynx were bulb suctioned. There was no nuchal cord. Both shoulders were delivered without complication.  Delayed cord clamping was done.Then the umbilical cord was doubly clamped and cut and the infant was handed to the awaiting pediatricians. Cord gases  were sent. The placenta was manually removed intact with 3-vessel cord and 30 units of pitocin were added to  the existing IV bag. The placenta was not sent to pathology. The uterine cavity was cleared with a sponge. The edges of the uterine incision were grasped with Allis clamps and the uterine incision was closed in two layers, first with a running, locking stitch of 0-vicryl, the second an imbricating non-locking stitch of 0-monocryl. Hemostasis was achieved. The abdomen and the gutters were cleared of old blood and clots. Tubes and ovaries appeared normal bilaterally. The adipose layer was re-approximated with three simple interrupted stitches using 2-0 plain suture. The fascia was closed with a running suture of 0-vicryl. The skin was closed with 4-0 Monocryl and exofin (skin glue).     QBL 730mL.           Hospital Course:   The patient's hospital course was unremarkable.  She recovered as anticipated and experienced no post-operative complications.  On discharge, her pain was well controlled. Vaginal bleeding is similar to peak menstrual flow.  Voiding without difficulty.  Ambulating well and tolerating a normal diet.  No fever or significant wound drainage.  Breastfeeding well.  Infant is stable.  No bowel movement yet.  She was discharged on post-partum day #3.    Post-partum hemoglobin:   Hemoglobin   Date Value Ref Range Status   02/23/2022 7.2 (L) 11.7 - 15.7 g/dL Final   05/19/2019 9.5 (L) 11.7 - 15.7 g/dL Final             Discharge Instructions and Follow-Up:   Discharge diet: Regular   Discharge activity: Activity as tolerated   Discharge follow-up: Follow up with primary care provider in 7 days   Wound care: Drink plenty of fluids  Ice to area for comfort  Keep wound clean and dry           Discharge Disposition:   Discharged to home      Attestation:  I have reviewed today's vital signs, notes, medications, labs and imaging.  Amount of time performed on this discharge summary: 30 minutes.    Jenni Myles MD

## 2022-02-24 NOTE — PLAN OF CARE
Vitals stable. Denies headache, chest pain or visual disturbances. Pain controlled with Ibuprofen and Tylenol with good relief. Incision site KATHRINE and WNL; no drainage noted. Independent with self and infant cares. Bedtime blood sugar 127. Breastfeeding with no assistance. Spouse at bedside and supportive. Bonding well with baby.

## 2022-02-24 NOTE — PROGRESS NOTES
[facility] Obstetrics Post-Op / Progress Note    Interval History   Doing well.  Pain is well-controlled.  No fevers.  No history of wound drainage, warmth or significant erythema.  Good appetite.  Denies chest pain, shortness of breath, nausea or vomiting.  Ambulatory.  Breastfeeding well.    Medications     - MEDICATION INSTRUCTIONS -       - MEDICATION INSTRUCTIONS -       oxytocin in 0.9% NaCl         acetaminophen  975 mg Oral Q6H     ibuprofen  800 mg Oral Q6H     iron sucrose (VENOFER) intermittent infusion  300 mg Intravenous Q72H     NIFEdipine ER OSMOTIC  30 mg Oral Daily     prenatal multivitamin w/iron  1 tablet Oral Daily     senna-docusate  1 tablet Oral BID    Or     senna-docusate  2 tablet Oral BID     sodium chloride (PF)  3 mL Intracatheter Q8H       Physical Exam   Temp: 97.8  F (36.6  C) Temp src: Oral BP: (!) 148/76 Pulse: 73   Resp: 18        Vitals:    22 1142   Weight: 97.1 kg (214 lb)     Vital Signs with Ranges  Temp:  [97.8  F (36.6  C)-98.2  F (36.8  C)] 97.8  F (36.6  C)  Pulse:  [67-75] 73  Resp:  [16-18] 18  BP: (114-148)/(57-76) 148/76  No intake/output data recorded.    Uterine fundus is firm, non-tender and at the level of the umbilicus  Incision C/D/I    Data   Recent Labs   Lab Test 22  0923 19  2247   ABO  --  O  O   RH  --  Pos  Pos   AS Negative Neg     Recent Labs   Lab Test 22  0640 22  0658   HGB 7.2* 7.5*     Recent Labs   Lab Test 18  0000   RUQIGG immune       Assessment & Plan   -3 Days Post-Op Procedure(s):   SECTION repeat    Doing well.  Clean wound without signs of infection.  Pain well-controlled.  Ambulation encouraged  Monitor wound for signs of infection  Pain control measures as needed  Discharge later today    A: 36 year old  POD#2 s/p repeat CS. Pregnancy notable for type 1 diabetes, pre-eclampsia w/o severe features and iron deficiency anemia.       Labs:   Hgb 7.2  Plt  152  Creatinine 0.62  AST 35  ALT 18     P:   Routine post-operative care. Encourage ambulation  Heme: 9.1>>7.5>7.2, acute blood loss anemia on chronic iron deficiency anemia, hgb stable s/p IV iron x1, continue PO iron on discharge, declines blood transfusion at this time  Rh positive, no Rhogam indicated. Rubella immune  Pain well controlled on oral tylenol and ibuprofen. Has not needed roxicodone.   Infant: Doing well, breastfeeding  Birth control: LNG-IUD       Pre-eclampsia w/o severe features  - PreE labs notable for UPC of 2.08 and platelets of 140, o/w normal, recheck this AM normal   - BP largely normotensive on nifedipine 30mg, continue   - h/o bicuspid aortic valve, last echo 1/27/22 w/o signs of aortic stenosis and mild AR, no evidence of aortic dilation, EF 65%     DM1  - hospitalist consult, patient running insulin off pump and well controlled  - f/u w/ endocrine outpatient      Postoperative urinary retention   - resolved s/p taylor      Anticipate discharge home POD#3    Jenni Myles MD on 2/24/2022 at 10:31 AM

## 2022-02-27 ENCOUNTER — HOSPITAL ENCOUNTER (EMERGENCY)
Facility: CLINIC | Age: 37
Discharge: HOME | End: 2022-02-27
Attending: EMERGENCY MEDICINE | Admitting: EMERGENCY MEDICINE
Payer: COMMERCIAL

## 2022-02-27 VITALS
SYSTOLIC BLOOD PRESSURE: 139 MMHG | WEIGHT: 186.51 LBS | TEMPERATURE: 98.6 F | OXYGEN SATURATION: 99 % | HEART RATE: 80 BPM | RESPIRATION RATE: 14 BRPM | DIASTOLIC BLOOD PRESSURE: 90 MMHG | BODY MASS INDEX: 29.21 KG/M2

## 2022-02-27 DIAGNOSIS — R51.9 POSTPARTUM HEADACHE: ICD-10-CM

## 2022-02-27 LAB
ALBUMIN SERPL-MCNC: 2.8 G/DL (ref 3.4–5)
ALBUMIN UR-MCNC: NEGATIVE MG/DL
ALP SERPL-CCNC: 209 U/L (ref 40–150)
ALT SERPL W P-5'-P-CCNC: 44 U/L (ref 0–50)
ANION GAP SERPL CALCULATED.3IONS-SCNC: 6 MMOL/L (ref 3–14)
APPEARANCE UR: CLEAR
AST SERPL W P-5'-P-CCNC: 36 U/L (ref 0–45)
BACTERIA #/AREA URNS HPF: ABNORMAL /HPF
BASOPHILS # BLD AUTO: 0 10E3/UL (ref 0–0.2)
BASOPHILS NFR BLD AUTO: 0 %
BILIRUB DIRECT SERPL-MCNC: <0.1 MG/DL (ref 0–0.2)
BILIRUB SERPL-MCNC: 0.3 MG/DL (ref 0.2–1.3)
BILIRUB UR QL STRIP: NEGATIVE
BUN SERPL-MCNC: 9 MG/DL (ref 7–30)
CALCIUM SERPL-MCNC: 8.4 MG/DL (ref 8.5–10.1)
CHLORIDE BLD-SCNC: 110 MMOL/L (ref 94–109)
CO2 SERPL-SCNC: 25 MMOL/L (ref 20–32)
COLOR UR AUTO: ABNORMAL
CREAT SERPL-MCNC: 0.62 MG/DL (ref 0.52–1.04)
EOSINOPHIL # BLD AUTO: 0.1 10E3/UL (ref 0–0.7)
EOSINOPHIL NFR BLD AUTO: 2 %
ERYTHROCYTE [DISTWIDTH] IN BLOOD BY AUTOMATED COUNT: 16.2 % (ref 10–15)
GFR SERPL CREATININE-BSD FRML MDRD: >90 ML/MIN/1.73M2
GLUCOSE BLD-MCNC: 117 MG/DL (ref 70–99)
GLUCOSE UR STRIP-MCNC: NEGATIVE MG/DL
HCT VFR BLD AUTO: 31.3 % (ref 35–47)
HGB BLD-MCNC: 9.6 G/DL (ref 11.7–15.7)
HGB UR QL STRIP: NEGATIVE
HOLD SPECIMEN: NORMAL
HOLD SPECIMEN: NORMAL
IMM GRANULOCYTES # BLD: 0 10E3/UL
IMM GRANULOCYTES NFR BLD: 1 %
KETONES UR STRIP-MCNC: NEGATIVE MG/DL
LEUKOCYTE ESTERASE UR QL STRIP: NEGATIVE
LYMPHOCYTES # BLD AUTO: 1.8 10E3/UL (ref 0.8–5.3)
LYMPHOCYTES NFR BLD AUTO: 27 %
MAGNESIUM SERPL-MCNC: 2.4 MG/DL (ref 1.6–2.3)
MCH RBC QN AUTO: 25.7 PG (ref 26.5–33)
MCHC RBC AUTO-ENTMCNC: 30.7 G/DL (ref 31.5–36.5)
MCV RBC AUTO: 84 FL (ref 78–100)
MONOCYTES # BLD AUTO: 0.4 10E3/UL (ref 0–1.3)
MONOCYTES NFR BLD AUTO: 6 %
NEUTROPHILS # BLD AUTO: 4.4 10E3/UL (ref 1.6–8.3)
NEUTROPHILS NFR BLD AUTO: 64 %
NITRATE UR QL: NEGATIVE
NRBC # BLD AUTO: 0 10E3/UL
NRBC BLD AUTO-RTO: 0 /100
PH UR STRIP: 6 [PH] (ref 5–7)
PLATELET # BLD AUTO: 380 10E3/UL (ref 150–450)
POTASSIUM BLD-SCNC: 3.7 MMOL/L (ref 3.4–5.3)
PROT SERPL-MCNC: 7.2 G/DL (ref 6.8–8.8)
RBC # BLD AUTO: 3.74 10E6/UL (ref 3.8–5.2)
RBC URINE: <1 /HPF
SODIUM SERPL-SCNC: 141 MMOL/L (ref 133–144)
SP GR UR STRIP: 1 (ref 1–1.03)
SQUAMOUS EPITHELIAL: 1 /HPF
UROBILINOGEN UR STRIP-MCNC: NORMAL MG/DL
WBC # BLD AUTO: 6.9 10E3/UL (ref 4–11)
WBC URINE: 1 /HPF

## 2022-02-27 PROCEDURE — 81001 URINALYSIS AUTO W/SCOPE: CPT | Performed by: EMERGENCY MEDICINE

## 2022-02-27 PROCEDURE — 82248 BILIRUBIN DIRECT: CPT | Performed by: EMERGENCY MEDICINE

## 2022-02-27 PROCEDURE — 85025 COMPLETE CBC W/AUTO DIFF WBC: CPT | Performed by: EMERGENCY MEDICINE

## 2022-02-27 PROCEDURE — 83735 ASSAY OF MAGNESIUM: CPT | Performed by: EMERGENCY MEDICINE

## 2022-02-27 PROCEDURE — 80053 COMPREHEN METABOLIC PANEL: CPT | Performed by: EMERGENCY MEDICINE

## 2022-02-27 PROCEDURE — 258N000003 HC RX IP 258 OP 636: Performed by: EMERGENCY MEDICINE

## 2022-02-27 PROCEDURE — 99285 EMERGENCY DEPT VISIT HI MDM: CPT | Mod: 25

## 2022-02-27 PROCEDURE — 96374 THER/PROPH/DIAG INJ IV PUSH: CPT

## 2022-02-27 PROCEDURE — 96361 HYDRATE IV INFUSION ADD-ON: CPT

## 2022-02-27 PROCEDURE — 250N000011 HC RX IP 250 OP 636: Performed by: EMERGENCY MEDICINE

## 2022-02-27 PROCEDURE — 36415 COLL VENOUS BLD VENIPUNCTURE: CPT | Performed by: EMERGENCY MEDICINE

## 2022-02-27 PROCEDURE — 93005 ELECTROCARDIOGRAM TRACING: CPT

## 2022-02-27 RX ORDER — KETOROLAC TROMETHAMINE 15 MG/ML
15 INJECTION, SOLUTION INTRAMUSCULAR; INTRAVENOUS ONCE
Status: COMPLETED | OUTPATIENT
Start: 2022-02-27 | End: 2022-02-27

## 2022-02-27 RX ADMIN — KETOROLAC TROMETHAMINE 15 MG: 15 INJECTION, SOLUTION INTRAMUSCULAR; INTRAVENOUS at 11:45

## 2022-02-27 RX ADMIN — SODIUM CHLORIDE 500 ML: 9 INJECTION, SOLUTION INTRAVENOUS at 11:42

## 2022-02-27 ASSESSMENT — ENCOUNTER SYMPTOMS
COLOR CHANGE: 0
NAUSEA: 0
VOMITING: 0
SLEEP DISTURBANCE: 1
FATIGUE: 1
SHORTNESS OF BREATH: 1
FEVER: 0
HEADACHES: 1

## 2022-02-27 NOTE — ED TRIAGE NOTES
Pt with hx of preeclampsia dx at c section 2/21.  Pt is on nifepidipine 30 mg at 0900.  Today pt continues to have a headache for the last 13 hours and blurry vision at night.

## 2022-02-27 NOTE — ED PROVIDER NOTES
History   Chief Complaint:  Headache    The history is provided by the patient.      Audra Stewart is a 36 year old female with history of preeclampsia, type I diabetes, and pancreatic disease who presents with a headache. The patient recounts a diagnosis of preeclampsia the day of her  section 22. She is currently on Adalat, and last took this around 0900 this morning. She comes to the ED today complaining of 14 hours of headache that has prevented her from sleeping. She also reports associated fatigue, mild shortness of breath, and vision changes which she describes as difficulty seeing (blurryniess) her phone at night. Patient denies any fevers, leg swelling, nausea, or vomiting. She has been taking Tylenol and ibuprofen at home for her headache, most recently around 0700 this morning. Currently breastfeeding; no marked breast tenderness or concerning redness.     Review of Systems   Constitutional: Positive for fatigue. Negative for fever.   Eyes: Positive for visual disturbance.   Respiratory: Positive for shortness of breath.    Cardiovascular: Negative for leg swelling.   Gastrointestinal: Negative for nausea and vomiting.   Skin: Negative for color change.   Neurological: Positive for headaches.   Psychiatric/Behavioral: Positive for sleep disturbance.   All other systems reviewed and are negative.    Allergies:  No known drug allergies    Medications:  Adalat    Past Medical History:     GERD  Pancreatic disease  Type I diabetes  Celiac disease  Herpes simplex      Past Surgical History:     section x2     Family History:    Father: hypertension  Mother: hypercholesteremia  Brother: depression     Social History:  . Two children.     Physical Exam     Patient Vitals for the past 24 hrs:   BP Temp Temp src Pulse Resp SpO2 Weight   22 1230 (!) 139/90 -- -- 80 14 99 % --   22 1200 135/77 -- -- 78 16 99 % --   22 1150 135/82 -- -- 75 11 98 % --   22 1145  135/82 -- -- 78 16 99 % --   22 1140 137/87 -- -- 74 16 99 % --   22 1111 (!) 145/90 98.6  F (37  C) Oral 90 16 98 % 84.6 kg (186 lb 8.2 oz)       Physical Exam  Gen: well appearing, in no acute distress  Oral : Mucous membranes moist,   Nose: No rhinorhea  Ears: External near normal, without drainage  Eyes: periorbital tissues and sclera normal   Neck: supple, no abnormal swelling  Lungs:   no resp distress, speaks full sentences  CV: Regular rate, regular rhythm  Abd: soft, nontender, nondistended, no rebound/guarding  Ext: no lower extremity edema  Skin: warm, dry, well perfused, no rashes/bruising/lesions on exposed skin. Healing  section incision with some dried blood, no drainage, no erythema. Non-tender.   Neuro: alert, no gross motor or sensory deficits,   Psych: pleasant mood, normal affect    Emergency Department Course   ECG  ECG obtained at 1135, ECG read at 1140  Normal sinus rhythm. Normal ECG.    Rate 73 bpm. IA interval 130 ms. QRS duration 92 ms. QT/QTc 416/458 ms. P-R-T axes 57 63 43.     Imaging:  No orders to display     Report per radiology    Laboratory:  Labs Ordered and Resulted from Time of ED Arrival to Time of ED Departure   BASIC METABOLIC PANEL - Abnormal       Result Value    Sodium 141      Potassium 3.7      Chloride 110 (*)     Carbon Dioxide (CO2) 25      Anion Gap 6      Urea Nitrogen 9      Creatinine 0.62      Calcium 8.4 (*)     Glucose 117 (*)     GFR Estimate >90     HEPATIC FUNCTION PANEL - Abnormal    Bilirubin Total 0.3      Bilirubin Direct <0.1      Protein Total 7.2      Albumin 2.8 (*)     Alkaline Phosphatase 209 (*)     AST 36      ALT 44     ROUTINE UA WITH MICROSCOPIC REFLEX TO CULTURE - Abnormal    Color Urine Straw      Appearance Urine Clear      Glucose Urine Negative      Bilirubin Urine Negative      Ketones Urine Negative      Specific Gravity Urine 1.004      Blood Urine Negative      pH Urine 6.0      Protein Albumin Urine Negative       Urobilinogen Urine Normal      Nitrite Urine Negative      Leukocyte Esterase Urine Negative      Bacteria Urine Few (*)     RBC Urine <1      WBC Urine 1      Squamous Epithelials Urine 1     MAGNESIUM - Abnormal    Magnesium 2.4 (*)    CBC WITH PLATELETS AND DIFFERENTIAL - Abnormal    WBC Count 6.9      RBC Count 3.74 (*)     Hemoglobin 9.6 (*)     Hematocrit 31.3 (*)     MCV 84      MCH 25.7 (*)     MCHC 30.7 (*)     RDW 16.2 (*)     Platelet Count 380      % Neutrophils 64      % Lymphocytes 27      % Monocytes 6      % Eosinophils 2      % Basophils 0      % Immature Granulocytes 1      NRBCs per 100 WBC 0      Absolute Neutrophils 4.4      Absolute Lymphocytes 1.8      Absolute Monocytes 0.4      Absolute Eosinophils 0.1      Absolute Basophils 0.0      Absolute Immature Granulocytes 0.0      Absolute NRBCs 0.0        Emergency Department Course:  Reviewed:  I reviewed nursing notes, vitals, past medical history and Care Everywhere    Assessments:  1121 I obtained history and examined the patient as noted above.   1228 I rechecked the patient and explained findings. Prepared for discharge.     Interventions:  1142 NS 1 L IV   1145 Toradol 15 mg IV     Disposition:  The patient was discharged to home.     Impression & Plan     Medical Decision Making:  Audra Stewart is a 36 year old female who presents to the ED with a headache, concerned about preeclampsia. She is several days postpartum, and was discharged with nifedipine. She has been taking that. She did not have preeclampsia prior to the delivery, but did have gestational hypertension when checking in for the delivery. Over the past 24 hours or so, she has had a headache since last evening. Her eyes get very fatigued and blurry in the evenings. No vomiting. She has been noticing that her blood pressure has been higher than normal at home. Here in the ED today, her blood pressure settled nicely, without any specific intervention. I did give her some IV fluids  and Toradol for her headache. Her alkaline phosphatase is slightly elevated, but AST and ALT are normal. She is not having protein in her urine, nor is she having any evidence of hemolysis or thrombocytopenia. Her leg swelling is improving from pre-delivery, and during her time in the ED, she felt much better. Without obvious markers of preeclampsia, the fact that her blood pressure has improved, and she is feeling better, I believe outpatient management is still appropriate. I think she is having normal headaches and fatigue from the postpartum period. She will continue with her blood pressure medication. Follow up with OBGYN as previously scheduled, which is just over a week away. I urged her to return to the ED if she develops any new or worsening symptoms. Patient feels comfortable with this management plan.     Critical Care Time: None    Diagnosis:    ICD-10-CM    1. Postpartum headache  O90.89     R51.9        Discharge Medications:  Discharge Medication List as of 2/27/2022 12:47 PM          Scribe Disclosure:  I, Vincenzo Flannery, am serving as a scribe on 2/27/2022 at 11:22 AM to personally document services performed by Arthur Bae MD based on my observations and the provider's statements to me.          Arthur Bae MD  02/27/22 [id number]

## 2022-02-28 LAB
ATRIAL RATE - MUSE: 73 BPM
DIASTOLIC BLOOD PRESSURE - MUSE: NORMAL MMHG
INTERPRETATION ECG - MUSE: NORMAL
P AXIS - MUSE: 57 DEGREES
PR INTERVAL - MUSE: 130 MS
QRS DURATION - MUSE: 92 MS
QT - MUSE: 416 MS
QTC - MUSE: 458 MS
R AXIS - MUSE: 63 DEGREES
SYSTOLIC BLOOD PRESSURE - MUSE: NORMAL MMHG
T AXIS - MUSE: 43 DEGREES
VENTRICULAR RATE- MUSE: 73 BPM

## 2022-03-26 ENCOUNTER — HEALTH MAINTENANCE LETTER (OUTPATIENT)
Age: 37
End: 2022-03-26

## 2022-09-18 ENCOUNTER — HEALTH MAINTENANCE LETTER (OUTPATIENT)
Age: 37
End: 2022-09-18

## 2023-01-29 ENCOUNTER — HEALTH MAINTENANCE LETTER (OUTPATIENT)
Age: 38
End: 2023-01-29

## 2023-05-05 NOTE — ANESTHESIA CARE TRANSFER NOTE
Patient: Audra Stewart    Procedure: Procedure(s):   SECTION repeat       Diagnosis: Previous  section [Z98.891]  Type 1 diabetes mellitus (H) [E10.9]  Diagnosis Additional Information: No value filed.    Anesthesia Type:   Spinal     Note:    Oropharynx: spontaneously breathing  Level of Consciousness: awake  Oxygen Supplementation: room air    Independent Airway: airway patency satisfactory and stable  Dentition: dentition unchanged  Vital Signs Stable: post-procedure vital signs reviewed and stable  Report to RN Given: handoff report given  Patient transferred to: Labor and Delivery    Handoff Report: Identifed the Patient, Identified the Reponsible Provider, Reviewed the pertinent medical history, Discussed the surgical course, Reviewed Intra-OP anesthesia mangement and issues during anesthesia, Set expectations for post-procedure period and Allowed opportunity for questions and acknowledgement of understanding      Vitals:  Vitals Value Taken Time   /77 22 1301   Temp     Pulse     Resp     SpO2 96 % 22 1301   Vitals shown include unvalidated device data.    Electronically Signed By: JOHAN Bernal CRNA  2022  1:03 PM  
18

## 2023-05-07 ENCOUNTER — HEALTH MAINTENANCE LETTER (OUTPATIENT)
Age: 38
End: 2023-05-07

## 2023-07-30 ENCOUNTER — HEALTH MAINTENANCE LETTER (OUTPATIENT)
Age: 38
End: 2023-07-30

## 2024-02-25 ENCOUNTER — HEALTH MAINTENANCE LETTER (OUTPATIENT)
Age: 39
End: 2024-02-25

## 2024-07-14 ENCOUNTER — HEALTH MAINTENANCE LETTER (OUTPATIENT)
Age: 39
End: 2024-07-14

## 2024-09-22 ENCOUNTER — HEALTH MAINTENANCE LETTER (OUTPATIENT)
Age: 39
End: 2024-09-22

## (undated) DEVICE — ESU GROUND PAD ADULT W/CORD E7507

## (undated) DEVICE — SOL NACL 0.9% IRRIG 1000ML BOTTLE 2F7124

## (undated) DEVICE — CAP BABY PINK/BLUE IC-2

## (undated) DEVICE — PAD CHUX UNDERPAD 30X36" P3036C

## (undated) DEVICE — CATH TRAY FOLEY SURESTEP 16FR DRAIN BAG STATOCK A899916

## (undated) DEVICE — LINEN FULL SHEET 5511

## (undated) DEVICE — GLOVE PROTEXIS POWDER FREE SMT 6.5  2D72PT65X

## (undated) DEVICE — DRSG ABDOMINAL 07 1/2X8" 7197D

## (undated) DEVICE — LINEN HALF SHEET 5512

## (undated) DEVICE — GLOVE PROTEXIS POWDER FREE 6.5 ORTHOPEDIC 2D73ET65

## (undated) DEVICE — GLOVE PROTEXIS BLUE W/NEU-THERA 6.5  2D73EB65

## (undated) DEVICE — SOL WATER IRRIG 1000ML BOTTLE 2F7114

## (undated) DEVICE — LINEN DRAPE 54X72" 5467

## (undated) DEVICE — PACK C-SECTION LF PL15OTA83B

## (undated) DEVICE — SU VICRYL 4-0 PS-2 18" UND J496H

## (undated) DEVICE — TRANSFER DEVICE BLOOD NDL HOLDER 364880

## (undated) DEVICE — LINEN BABY BLANKET 5434

## (undated) DEVICE — LINEN TOWEL PACK X10 5473

## (undated) DEVICE — SUCTION CANISTER MEDIVAC LINER 3000ML W/LID 65651-530

## (undated) DEVICE — GLOVE PROTEXIS MICRO 6.5  2D73PM65

## (undated) DEVICE — CATH TRAY FOLEY 16FR SILICONE 907416

## (undated) DEVICE — NDL ECLIPSE 22GA 1.5"

## (undated) DEVICE — SU PLAIN 3-0 SH 27" G322H

## (undated) DEVICE — GLOVE PROTEXIS W/NEU-THERA 7.0  2D73TE70

## (undated) DEVICE — BLADE CLIPPER SGL USE 9680

## (undated) DEVICE — PREP CHLORAPREP 26ML TINTED HI-LITE ORANGE 930815

## (undated) DEVICE — Device

## (undated) DEVICE — BAG CLEAR TRASH 1.3M 39X33" P4040C

## (undated) DEVICE — SU PDS II 0 CT 36" Z358T

## (undated) DEVICE — SU MONOCRYL 4-0 PS-2 27" UND Y426H

## (undated) DEVICE — SU VICRYL 0 CT-1 36" J346H

## (undated) DEVICE — ADH SKIN CLOSURE PREMIERPRO EXOFIN 1.0ML 3470

## (undated) DEVICE — GLOVE PROTEXIS W/NEU-THERA 6.0  2D73TE60

## (undated) DEVICE — DRSG STERI STRIP 1/2X4" R1547

## (undated) DEVICE — SYR 10ML FINGER CONTROL W/O NDL 309695

## (undated) DEVICE — SURGICEL POWDER ABSORBABLE HEMOSTAT 3GM 3013SP

## (undated) DEVICE — SU MONOCRYL 0 CT-1 36" UND Y946H

## (undated) DEVICE — STOCKING SLEEVE VASOPRESS COMPRESSION CALF MED 18" VP501M

## (undated) DEVICE — STOCKING SLEEVE VASOPRESS COMPRESSION CALF MED VP501M

## (undated) DEVICE — SU VICRYL 3-0 CT-1 36" J338H

## (undated) DEVICE — PREP DURAPREP 26ML APL 8630

## (undated) RX ORDER — ONDANSETRON 2 MG/ML
INJECTION INTRAMUSCULAR; INTRAVENOUS
Status: DISPENSED
Start: 2019-05-18

## (undated) RX ORDER — OXYTOCIN/0.9 % SODIUM CHLORIDE 30/500 ML
PLASTIC BAG, INJECTION (ML) INTRAVENOUS
Status: DISPENSED
Start: 2022-02-21

## (undated) RX ORDER — FENTANYL CITRATE-0.9 % NACL/PF 10 MCG/ML
PLASTIC BAG, INJECTION (ML) INTRAVENOUS
Status: DISPENSED
Start: 2022-02-21

## (undated) RX ORDER — ONDANSETRON 2 MG/ML
INJECTION INTRAMUSCULAR; INTRAVENOUS
Status: DISPENSED
Start: 2022-02-21

## (undated) RX ORDER — MORPHINE SULFATE 1 MG/ML
INJECTION, SOLUTION EPIDURAL; INTRATHECAL; INTRAVENOUS
Status: DISPENSED
Start: 2019-05-18

## (undated) RX ORDER — OXYTOCIN/0.9 % SODIUM CHLORIDE 30/500 ML
PLASTIC BAG, INJECTION (ML) INTRAVENOUS
Status: DISPENSED
Start: 2019-05-18

## (undated) RX ORDER — MORPHINE SULFATE 1 MG/ML
INJECTION, SOLUTION EPIDURAL; INTRATHECAL; INTRAVENOUS
Status: DISPENSED
Start: 2022-02-21